# Patient Record
Sex: FEMALE | Race: WHITE | NOT HISPANIC OR LATINO | Employment: PART TIME | URBAN - METROPOLITAN AREA
[De-identification: names, ages, dates, MRNs, and addresses within clinical notes are randomized per-mention and may not be internally consistent; named-entity substitution may affect disease eponyms.]

---
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
## 2020-01-02 ENCOUNTER — OFFICE VISIT (OUTPATIENT)
Dept: OBGYN CLINIC | Facility: CLINIC | Age: 20
End: 2020-01-02
Payer: COMMERCIAL

## 2020-01-02 VITALS
HEIGHT: 64 IN | SYSTOLIC BLOOD PRESSURE: 114 MMHG | BODY MASS INDEX: 19.39 KG/M2 | WEIGHT: 113.6 LBS | DIASTOLIC BLOOD PRESSURE: 62 MMHG

## 2020-01-02 DIAGNOSIS — Z30.41 ENCOUNTER FOR SURVEILLANCE OF CONTRACEPTIVE PILLS: Primary | ICD-10-CM

## 2020-01-02 DIAGNOSIS — N92.6 IRREGULAR MENSES: ICD-10-CM

## 2020-01-02 PROCEDURE — 99213 OFFICE O/P EST LOW 20 MIN: CPT | Performed by: PHYSICIAN ASSISTANT

## 2020-01-02 RX ORDER — NORETHINDRONE AND ETHINYL ESTRADIOL AND FERROUS FUMARATE 0.8-25(24)
1 KIT ORAL DAILY
Qty: 28 TABLET | Refills: 8 | Status: SHIPPED | OUTPATIENT
Start: 2020-01-02 | End: 2020-09-24 | Stop reason: SDUPTHER

## 2020-01-02 NOTE — PROGRESS NOTES
Assessment/Plan:    No problem-specific Assessment & Plan notes found for this encounter  Diagnoses and all orders for this visit:    Encounter for surveillance of contraceptive pills    Irregular menses  -     Norethin-Eth Estradiol-Fe 0 8-25 MG-MCG CHEW; Chew 1 tablet daily        Discussed OCP use with patient  Stressed the need to take medication every day at the same time, and to not skip doses  Refills x 8 of Generess sent to pharmacy  Stressed condom use for STD prevention  If no problems, patient to return in August 2020 for yearly gyn exam  Time of visit 15 minutes; >50% spent counseling  Subjective:      Patient ID: Danna Kay is a 23 y o  female  Patient is here for birth control f/u  Was started on Generess in August States she is doing well overall  Periods are regular every 28 days, and bleeding lasts for 3 days  Denies heavy bleeding, severe cramping, HA, and mood symptoms  Did have two episodes of spotting, but they were after taking OCP early or late  She has not missed any doses  Denies bowel/bladder changes, pelvic pain, bloating, abdominal pain, n/v, and change in appetite  Would like to remain on this OCP  The following portions of the patient's history were reviewed and updated as appropriate: allergies, current medications, past family history, past medical history, past social history, past surgical history and problem list     Review of Systems   Constitutional: Negative for appetite change and unexpected weight change  Gastrointestinal: Negative for abdominal distention, abdominal pain, constipation, diarrhea, nausea and vomiting  Genitourinary: Negative for difficulty urinating, dysuria, frequency, genital sores, hematuria, menstrual problem, pelvic pain, urgency, vaginal bleeding, vaginal discharge and vaginal pain           Objective:      /62   Ht 5' 4" (1 626 m)   Wt 51 5 kg (113 lb 9 6 oz)   LMP 12/24/2019   BMI 19 50 kg/m²          Physical Exam   Constitutional: She is oriented to person, place, and time  Vital signs are normal  She appears well-developed and well-nourished  Neurological: She is alert and oriented to person, place, and time  Psychiatric: She has a normal mood and affect  Her behavior is normal  Judgment and thought content normal    Vitals reviewed

## 2020-01-02 NOTE — PATIENT INSTRUCTIONS
Refills x 8 of Generess sent to pharmacy  Take medication every day at the same time  Do not skip doses  Practice consistent condom use for STD prevention

## 2020-02-05 ENCOUNTER — TRANSCRIBE ORDERS (OUTPATIENT)
Dept: ADMINISTRATIVE | Age: 20
End: 2020-02-05

## 2020-02-05 ENCOUNTER — APPOINTMENT (OUTPATIENT)
Dept: LAB | Age: 20
End: 2020-02-05
Payer: COMMERCIAL

## 2020-02-05 DIAGNOSIS — J02.9 ACUTE PHARYNGITIS, UNSPECIFIED ETIOLOGY: ICD-10-CM

## 2020-02-05 DIAGNOSIS — J02.9 ACUTE PHARYNGITIS, UNSPECIFIED ETIOLOGY: Primary | ICD-10-CM

## 2020-02-05 LAB
ALBUMIN SERPL BCP-MCNC: 3.8 G/DL (ref 3.5–5)
ALP SERPL-CCNC: 52 U/L (ref 46–384)
ALT SERPL W P-5'-P-CCNC: 25 U/L (ref 12–78)
ANION GAP SERPL CALCULATED.3IONS-SCNC: 8 MMOL/L (ref 4–13)
AST SERPL W P-5'-P-CCNC: 18 U/L (ref 5–45)
BILIRUB SERPL-MCNC: 0.3 MG/DL (ref 0.2–1)
BUN SERPL-MCNC: 7 MG/DL (ref 5–25)
CALCIUM SERPL-MCNC: 9.5 MG/DL (ref 8.3–10.1)
CHLORIDE SERPL-SCNC: 108 MMOL/L (ref 100–108)
CO2 SERPL-SCNC: 23 MMOL/L (ref 21–32)
CREAT SERPL-MCNC: 0.69 MG/DL (ref 0.6–1.3)
GFR SERPL CREATININE-BSD FRML MDRD: 127 ML/MIN/1.73SQ M
GLUCOSE SERPL-MCNC: 175 MG/DL (ref 65–140)
POTASSIUM SERPL-SCNC: 3.1 MMOL/L (ref 3.5–5.3)
PROT SERPL-MCNC: 8.1 G/DL (ref 6.4–8.2)
SODIUM SERPL-SCNC: 139 MMOL/L (ref 136–145)

## 2020-02-05 PROCEDURE — 36415 COLL VENOUS BLD VENIPUNCTURE: CPT

## 2020-02-05 PROCEDURE — 86063 ANTISTREPTOLYSIN O SCREEN: CPT

## 2020-02-05 PROCEDURE — 86664 EPSTEIN-BARR NUCLEAR ANTIGEN: CPT

## 2020-02-05 PROCEDURE — 86663 EPSTEIN-BARR ANTIBODY: CPT

## 2020-02-05 PROCEDURE — 86308 HETEROPHILE ANTIBODY SCREEN: CPT

## 2020-02-05 PROCEDURE — 80053 COMPREHEN METABOLIC PANEL: CPT

## 2020-02-05 PROCEDURE — 86644 CMV ANTIBODY: CPT

## 2020-02-05 PROCEDURE — 86645 CMV ANTIBODY IGM: CPT

## 2020-02-05 PROCEDURE — 86665 EPSTEIN-BARR CAPSID VCA: CPT

## 2020-02-06 LAB
ASO AB TITR SER LA: NORMAL {TITER}
CMV IGG SERPL IA-ACNC: <0.6 U/ML (ref 0–0.59)
CMV IGM SERPL IA-ACNC: <30 AU/ML (ref 0–29.9)
EBV NA IGG SER IA-ACNC: <18 U/ML (ref 0–17.9)
EBV VCA IGG SER IA-ACNC: <18 U/ML (ref 0–17.9)
EBV VCA IGM SER IA-ACNC: <36 U/ML (ref 0–35.9)
HETEROPH AB SER QL: NEGATIVE
INTERPRETATION: NORMAL

## 2020-09-24 ENCOUNTER — OFFICE VISIT (OUTPATIENT)
Dept: OBGYN CLINIC | Facility: CLINIC | Age: 20
End: 2020-09-24
Payer: COMMERCIAL

## 2020-09-24 VITALS
BODY MASS INDEX: 19.12 KG/M2 | SYSTOLIC BLOOD PRESSURE: 120 MMHG | WEIGHT: 112 LBS | HEIGHT: 64 IN | DIASTOLIC BLOOD PRESSURE: 70 MMHG | TEMPERATURE: 98.7 F

## 2020-09-24 DIAGNOSIS — N92.6 IRREGULAR MENSES: ICD-10-CM

## 2020-09-24 DIAGNOSIS — Z30.41 ENCOUNTER FOR SURVEILLANCE OF CONTRACEPTIVE PILLS: Primary | ICD-10-CM

## 2020-09-24 PROCEDURE — 99213 OFFICE O/P EST LOW 20 MIN: CPT | Performed by: PHYSICIAN ASSISTANT

## 2020-09-24 RX ORDER — NORETHINDRONE AND ETHINYL ESTRADIOL AND FERROUS FUMARATE 0.8-25(24)
1 KIT ORAL DAILY
Qty: 28 TABLET | Refills: 5 | Status: SHIPPED | OUTPATIENT
Start: 2020-09-24 | End: 2021-03-02

## 2020-09-24 NOTE — PROGRESS NOTES
Assessment/Plan:    No problem-specific Assessment & Plan notes found for this encounter  Diagnoses and all orders for this visit:    Encounter for surveillance of contraceptive pills    Irregular menses  -     Norethin-Eth Estradiol-Fe 0 8-25 MG-MCG CHEW; Chew 1 tablet daily        Discussed OCP use with patient  Stressed the need to take medication every day at the same time, and to not skip doses  Refills x 5 sent to pharmacy  Stressed consistent condom use for STD prevention  Call if spotting worsens  If no problems, patient to return in March 2021 for yearly gyn exam   Time of visit 15 minutes; >50% spent counseling  Subjective:      Patient ID: Felipe Perez is a 21 y o  female  Patient is here for birth control f/u  States she is doing well overall  No complaints on her OCP  Periods are regular every 28 days, and bleeding lasts for 4 days  Denies heavy bleeding, severe cramping, HA, and mood symptoms  Does experience occasional spotting in the third week of her pill pack  Requests refills today  Patient denies bowel/bladder changes, pelvic pain, bloating, abdominal pain, n/v, and change in appetite  The following portions of the patient's history were reviewed and updated as appropriate: allergies, current medications, past family history, past medical history, past social history, past surgical history and problem list     Review of Systems   Constitutional: Negative for appetite change and unexpected weight change  Gastrointestinal: Negative for abdominal distention, abdominal pain, constipation, diarrhea, nausea and vomiting  Genitourinary: Negative for difficulty urinating, dysuria, frequency, genital sores, hematuria, menstrual problem, pelvic pain, urgency, vaginal bleeding, vaginal discharge and vaginal pain           Objective:      /70   Temp 98 7 °F (37 1 °C)   Ht 5' 4" (1 626 m)   Wt 50 8 kg (112 lb)   LMP 09/01/2020   BMI 19 22 kg/m²          Physical Exam  Vitals signs reviewed  Constitutional:       Appearance: Normal appearance  She is well-developed and normal weight  Neurological:      Mental Status: She is alert and oriented to person, place, and time  Psychiatric:         Mood and Affect: Mood normal          Behavior: Behavior normal  Behavior is cooperative  Thought Content:  Thought content normal          Judgment: Judgment normal

## 2020-09-24 NOTE — PATIENT INSTRUCTIONS
Refills x 5 of OCP sent to pharmacy  Take medication every day at the same time; do not skip doses  Practice consistent condom use for STD prevention  Call if spotting worsens

## 2020-10-02 ENCOUNTER — OFFICE VISIT (OUTPATIENT)
Dept: OBGYN CLINIC | Facility: CLINIC | Age: 20
End: 2020-10-02
Payer: COMMERCIAL

## 2020-10-02 VITALS
HEIGHT: 64 IN | SYSTOLIC BLOOD PRESSURE: 122 MMHG | TEMPERATURE: 98.2 F | BODY MASS INDEX: 19.12 KG/M2 | WEIGHT: 112 LBS | DIASTOLIC BLOOD PRESSURE: 74 MMHG

## 2020-10-02 DIAGNOSIS — N94.6 DYSMENORRHEA: ICD-10-CM

## 2020-10-02 DIAGNOSIS — N76.4 VULVAR ABSCESS: Primary | ICD-10-CM

## 2020-10-02 DIAGNOSIS — Z11.3 SCREEN FOR STD (SEXUALLY TRANSMITTED DISEASE): ICD-10-CM

## 2020-10-02 PROCEDURE — 87591 N.GONORRHOEAE DNA AMP PROB: CPT | Performed by: PHYSICIAN ASSISTANT

## 2020-10-02 PROCEDURE — 87491 CHLMYD TRACH DNA AMP PROBE: CPT | Performed by: PHYSICIAN ASSISTANT

## 2020-10-02 PROCEDURE — 99214 OFFICE O/P EST MOD 30 MIN: CPT | Performed by: PHYSICIAN ASSISTANT

## 2020-10-06 LAB
C TRACH DNA SPEC QL NAA+PROBE: NEGATIVE
N GONORRHOEA DNA SPEC QL NAA+PROBE: NEGATIVE

## 2020-10-08 ENCOUNTER — OFFICE VISIT (OUTPATIENT)
Dept: OBGYN CLINIC | Facility: CLINIC | Age: 20
End: 2020-10-08
Payer: COMMERCIAL

## 2020-10-08 ENCOUNTER — ULTRASOUND (OUTPATIENT)
Dept: OBGYN CLINIC | Facility: CLINIC | Age: 20
End: 2020-10-08
Payer: COMMERCIAL

## 2020-10-08 VITALS
WEIGHT: 110 LBS | DIASTOLIC BLOOD PRESSURE: 72 MMHG | HEIGHT: 64 IN | SYSTOLIC BLOOD PRESSURE: 124 MMHG | TEMPERATURE: 98.5 F | BODY MASS INDEX: 18.78 KG/M2

## 2020-10-08 DIAGNOSIS — R10.2 PELVIC PAIN: Primary | ICD-10-CM

## 2020-10-08 DIAGNOSIS — N76.4 VULVAR ABSCESS: Primary | ICD-10-CM

## 2020-10-08 DIAGNOSIS — N94.6 DYSMENORRHEA: ICD-10-CM

## 2020-10-08 PROCEDURE — 76856 US EXAM PELVIC COMPLETE: CPT | Performed by: OBSTETRICS & GYNECOLOGY

## 2020-10-08 PROCEDURE — 99213 OFFICE O/P EST LOW 20 MIN: CPT | Performed by: PHYSICIAN ASSISTANT

## 2020-10-15 ENCOUNTER — OFFICE VISIT (OUTPATIENT)
Dept: OBGYN CLINIC | Facility: CLINIC | Age: 20
End: 2020-10-15
Payer: COMMERCIAL

## 2020-10-15 VITALS
HEIGHT: 64 IN | SYSTOLIC BLOOD PRESSURE: 120 MMHG | BODY MASS INDEX: 19.12 KG/M2 | DIASTOLIC BLOOD PRESSURE: 74 MMHG | TEMPERATURE: 98.3 F | WEIGHT: 112 LBS

## 2020-10-15 DIAGNOSIS — N90.7 VULVAR CYST: Primary | ICD-10-CM

## 2020-10-15 PROCEDURE — 99213 OFFICE O/P EST LOW 20 MIN: CPT | Performed by: PHYSICIAN ASSISTANT

## 2021-03-01 DIAGNOSIS — N92.6 IRREGULAR MENSES: ICD-10-CM

## 2021-03-02 RX ORDER — NORETHINDRONE AND ETHINYL ESTRADIOL AND FERROUS FUMARATE 0.8-25(24)
KIT ORAL
Qty: 28 TABLET | Refills: 0 | Status: SHIPPED | OUTPATIENT
Start: 2021-03-02 | End: 2021-03-30 | Stop reason: SINTOL

## 2021-03-30 ENCOUNTER — ANNUAL EXAM (OUTPATIENT)
Dept: OBGYN CLINIC | Facility: CLINIC | Age: 21
End: 2021-03-30
Payer: COMMERCIAL

## 2021-03-30 VITALS
WEIGHT: 111 LBS | DIASTOLIC BLOOD PRESSURE: 74 MMHG | SYSTOLIC BLOOD PRESSURE: 122 MMHG | BODY MASS INDEX: 18.95 KG/M2 | HEIGHT: 64 IN

## 2021-03-30 DIAGNOSIS — Z01.411 ENCOUNTER FOR GYNECOLOGICAL EXAMINATION WITH ABNORMAL FINDING: Primary | ICD-10-CM

## 2021-03-30 DIAGNOSIS — N92.3 INTERMENSTRUAL BLEEDING: ICD-10-CM

## 2021-03-30 PROCEDURE — 99395 PREV VISIT EST AGE 18-39: CPT | Performed by: PHYSICIAN ASSISTANT

## 2021-03-30 RX ORDER — NORETHINDRONE ACETATE AND ETHINYL ESTRADIOL 1MG-20(21)
1 KIT ORAL DAILY
Qty: 28 TABLET | Refills: 3 | Status: SHIPPED | OUTPATIENT
Start: 2021-03-30 | End: 2021-07-20

## 2021-03-30 NOTE — PATIENT INSTRUCTIONS
Rx for Junel Fe 1/20 x 3 refills sent to pharmacy  Finish current pill pack, then start new medication  Take pill every day at the same time; do not skip doses  Practice consistent condom use for STD prevention  Call with problems

## 2021-03-30 NOTE — PROGRESS NOTES
Assessment/Plan:    No problem-specific Assessment & Plan notes found for this encounter  Diagnoses and all orders for this visit:    Encounter for gynecological examination with abnormal finding  -     Pap Lb (Liquid-based)    Intermenstrual bleeding  -     norethindrone-ethinyl estradiol (JUNEL FE 1/20) 1-20 MG-MCG per tablet; Take 1 tablet by mouth daily        Pap done  Will switch OCP  Rx for Junel Fe 1/20 x 3 refills sent to pharmacy  Patient to finish current pill pack, then start new medication  Take pill every day at the same time; do not skip doses  Practice consistent condom use for STD prevention  F/u in 3 months for birth control check; call with problems in the interim  Subjective:      Patient ID: Vero Cordon is a 24 y o  female  Patient is here for annual exam   States she is doing well  No complaints on her OCP  Periods are regular every 28 days, and bleeding lasts for 3-4 days  She denies heavy bleeding, severe cramping, headache, and mood symptoms  Complains of intermittent menstrual spotting  She takes pill every day at the same time and does not skip doses  Interested in switching OCP  Patient is sexually active with a monogamous partner  Last STD screen in October 2020 was negative  Declines testing today  Patient denies any change in bowel/bladder habits, pelvic pain, bloating, abdominal pain, n/v, change in appetite, and thyroid disease  Patient is performing self-breast exam   Denies new masses, skin changes, nipple discharge, and pain/tenderness  The following portions of the patient's history were reviewed and updated as appropriate: allergies, current medications, past family history, past medical history, past social history, past surgical history and problem list     Review of Systems   Constitutional: Negative for appetite change and unexpected weight change  Cardiovascular:        No masses, skin changes, nipple discharge, and pain/tenderness  Gastrointestinal: Negative for abdominal distention, abdominal pain, constipation, diarrhea, nausea and vomiting  Genitourinary: Positive for menstrual problem (Intermenstrual spotting)  Negative for difficulty urinating, dysuria, frequency, genital sores, hematuria, pelvic pain, urgency, vaginal bleeding, vaginal discharge and vaginal pain  Neurological: Negative for headaches  Objective:      /74   Ht 5' 4" (1 626 m)   Wt 50 3 kg (111 lb)   LMP 03/21/2021   BMI 19 05 kg/m²          Physical Exam  Vitals signs reviewed  Exam conducted with a chaperone present  Constitutional:       Appearance: Normal appearance  She is well-developed and normal weight  Neck:      Musculoskeletal: Neck supple  Thyroid: No thyromegaly  Pulmonary:      Effort: Pulmonary effort is normal    Chest:      Breasts: Breasts are symmetrical          Right: Normal  No swelling, bleeding, inverted nipple, mass, nipple discharge, skin change or tenderness  Left: Normal  No swelling, bleeding, inverted nipple, mass, nipple discharge, skin change or tenderness  Abdominal:      General: Abdomen is flat  There is no distension  Palpations: Abdomen is soft  Tenderness: There is no abdominal tenderness  Genitourinary:     General: Normal vulva  Pubic Area: No rash  Labia:         Right: No rash, tenderness, lesion or injury  Left: No rash, tenderness, lesion or injury  Vagina: Normal  No vaginal discharge, erythema, tenderness or bleeding  Cervix: Normal       Uterus: Normal        Adnexa: Right adnexa normal and left adnexa normal         Right: No mass, tenderness or fullness  Left: No mass, tenderness or fullness  Lymphadenopathy:      Cervical: No cervical adenopathy  Upper Body:      Right upper body: No supraclavicular or axillary adenopathy  Left upper body: No supraclavicular or axillary adenopathy        Lower Body: No right inguinal adenopathy  No left inguinal adenopathy  Skin:     General: Skin is warm and dry  Neurological:      Mental Status: She is alert and oriented to person, place, and time  Psychiatric:         Mood and Affect: Mood normal          Behavior: Behavior normal  Behavior is cooperative  Thought Content:  Thought content normal          Judgment: Judgment normal

## 2021-04-03 LAB
CYTOLOGIST CVX/VAG CYTO: NORMAL
DX ICD CODE: NORMAL
OTHER STN SPEC: NORMAL
PATH REPORT.FINAL DX SPEC: NORMAL
SL AMB NOTE:: NORMAL
SL AMB SPECIMEN ADEQUACY: NORMAL

## 2021-04-06 ENCOUNTER — TELEPHONE (OUTPATIENT)
Dept: OBGYN CLINIC | Facility: CLINIC | Age: 21
End: 2021-04-06

## 2021-04-06 NOTE — TELEPHONE ENCOUNTER
Spoke with patient regarding Pap results  Cytology negative, but showed presence of Candida  Recommended patient to use OTC Monistat 7 day  Call with problems

## 2021-07-27 ENCOUNTER — OFFICE VISIT (OUTPATIENT)
Dept: OBGYN CLINIC | Facility: CLINIC | Age: 21
End: 2021-07-27
Payer: COMMERCIAL

## 2021-07-27 VITALS
WEIGHT: 109 LBS | HEIGHT: 64 IN | BODY MASS INDEX: 18.61 KG/M2 | DIASTOLIC BLOOD PRESSURE: 72 MMHG | SYSTOLIC BLOOD PRESSURE: 120 MMHG

## 2021-07-27 DIAGNOSIS — N92.3 INTERMENSTRUAL BLEEDING: ICD-10-CM

## 2021-07-27 DIAGNOSIS — Z30.41 ENCOUNTER FOR SURVEILLANCE OF CONTRACEPTIVE PILLS: Primary | ICD-10-CM

## 2021-07-27 PROCEDURE — 99213 OFFICE O/P EST LOW 20 MIN: CPT | Performed by: PHYSICIAN ASSISTANT

## 2021-07-27 RX ORDER — NORETHINDRONE ACETATE AND ETHINYL ESTRADIOL 1MG-20(21)
1 KIT ORAL DAILY
Qty: 84 TABLET | Refills: 2 | Status: SHIPPED | OUTPATIENT
Start: 2021-07-27

## 2021-07-27 RX ORDER — EPINEPHRINE 0.3 MG/.3ML
INJECTION SUBCUTANEOUS
COMMUNITY
Start: 2021-05-13

## 2021-07-27 NOTE — PATIENT INSTRUCTIONS
Refills of OCP sent to pharmacy  Take pill every day at the same time; do not skip doses  Practice consistent condom use for STD prevention  Call with problems

## 2021-07-27 NOTE — PROGRESS NOTES
Assessment/Plan:    No problem-specific Assessment & Plan notes found for this encounter  Diagnoses and all orders for this visit:    Encounter for surveillance of contraceptive pills    Intermenstrual bleeding  -     norethindrone-ethinyl estradiol (Hailey FE 1/20) 1-20 MG-MCG per tablet; Take 1 tablet by mouth daily    Other orders  -     EPINEPHrine (EPIPEN) 0 3 mg/0 3 mL SOAJ; INJECT THE CONTENTS OF 1 PEN (0 3ML) INTO THE MUSCLE AS NEEDED  -     silver sulfadiazine (SILVADENE,SSD) 1 % cream; APPLY TOPICALLY ONCE DAILY FOR 5 DAYS        Discussed OCP use with patient  Continue to take pill every day at the same time; do not skip doses  Practice consistent condom use for STD prevention  Refills of OCP sent to pharmacy  F/u in April 2022 for yearly gyn exam   Call with problems in the interim  Subjective:      Patient ID: Corby Delatorre is a 24 y o  female  Patient is here for birth control f/u  Was started on Junel Fe 1/20 in April  States she is doing well overall  Periods are regular every 28 days, and bleeding lasts for 7 days  She denies heavy bleeding, severe cramping, HA, and mood symptoms  Patient also denies bowel/bladder changes, pelvic pain, bloating, abdominal pain, n/v, and appetite changes  Would like to continue on current OCP  The following portions of the patient's history were reviewed and updated as appropriate: allergies, current medications, past family history, past medical history, past social history, past surgical history and problem list     Review of Systems   Constitutional: Negative for appetite change and unexpected weight change  Gastrointestinal: Negative for abdominal distention, abdominal pain, constipation, diarrhea, nausea and vomiting  Genitourinary: Negative for difficulty urinating, dysuria, frequency, genital sores, hematuria, menstrual problem, pelvic pain, urgency, vaginal bleeding, vaginal discharge and vaginal pain     Neurological: Negative for headaches  Objective:      /72 (BP Location: Left arm, Patient Position: Sitting, Cuff Size: Standard)   Ht 5' 4" (1 626 m)   Wt 49 4 kg (109 lb)   LMP 07/01/2021   BMI 18 71 kg/m²          Physical Exam  Vitals reviewed  Constitutional:       Appearance: Normal appearance  She is well-developed and normal weight  Neurological:      Mental Status: She is alert and oriented to person, place, and time  Psychiatric:         Mood and Affect: Mood normal          Behavior: Behavior normal  Behavior is cooperative  Thought Content:  Thought content normal          Judgment: Judgment normal

## 2021-08-06 ENCOUNTER — TELEPHONE (OUTPATIENT)
Dept: OBGYN CLINIC | Facility: CLINIC | Age: 21
End: 2021-08-06

## 2021-08-06 ENCOUNTER — APPOINTMENT (OUTPATIENT)
Dept: LAB | Facility: CLINIC | Age: 21
End: 2021-08-06
Payer: COMMERCIAL

## 2021-08-06 DIAGNOSIS — R55 SYNCOPE AND COLLAPSE: ICD-10-CM

## 2021-08-06 DIAGNOSIS — M79.605 LEFT LEG PAIN: ICD-10-CM

## 2021-08-06 LAB
ALBUMIN SERPL BCP-MCNC: 4.5 G/DL (ref 3.5–5)
ALP SERPL-CCNC: 76 U/L (ref 46–116)
ALT SERPL W P-5'-P-CCNC: 23 U/L (ref 12–78)
ANION GAP SERPL CALCULATED.3IONS-SCNC: 10 MMOL/L (ref 4–13)
AST SERPL W P-5'-P-CCNC: 19 U/L (ref 5–45)
BASOPHILS # BLD AUTO: 0.03 THOUSANDS/ΜL (ref 0–0.1)
BASOPHILS NFR BLD AUTO: 1 % (ref 0–1)
BILIRUB SERPL-MCNC: 0.45 MG/DL (ref 0.2–1)
BUN SERPL-MCNC: 11 MG/DL (ref 5–25)
CALCIUM SERPL-MCNC: 9.4 MG/DL (ref 8.3–10.1)
CHLORIDE SERPL-SCNC: 104 MMOL/L (ref 100–108)
CO2 SERPL-SCNC: 26 MMOL/L (ref 21–32)
CREAT SERPL-MCNC: 0.59 MG/DL (ref 0.6–1.3)
D DIMER PPP FEU-MCNC: <0.27 UG/ML FEU
EOSINOPHIL # BLD AUTO: 0.04 THOUSAND/ΜL (ref 0–0.61)
EOSINOPHIL NFR BLD AUTO: 1 % (ref 0–6)
ERYTHROCYTE [DISTWIDTH] IN BLOOD BY AUTOMATED COUNT: 12.3 % (ref 11.6–15.1)
GFR SERPL CREATININE-BSD FRML MDRD: 131 ML/MIN/1.73SQ M
GLUCOSE SERPL-MCNC: 107 MG/DL (ref 65–140)
HCT VFR BLD AUTO: 42 % (ref 34.8–46.1)
HGB BLD-MCNC: 14.1 G/DL (ref 11.5–15.4)
IMM GRANULOCYTES # BLD AUTO: 0 THOUSAND/UL (ref 0–0.2)
IMM GRANULOCYTES NFR BLD AUTO: 0 % (ref 0–2)
LYMPHOCYTES # BLD AUTO: 1.43 THOUSANDS/ΜL (ref 0.6–4.47)
LYMPHOCYTES NFR BLD AUTO: 28 % (ref 14–44)
MCH RBC QN AUTO: 30.4 PG (ref 26.8–34.3)
MCHC RBC AUTO-ENTMCNC: 33.6 G/DL (ref 31.4–37.4)
MCV RBC AUTO: 91 FL (ref 82–98)
MONOCYTES # BLD AUTO: 0.5 THOUSAND/ΜL (ref 0.17–1.22)
MONOCYTES NFR BLD AUTO: 10 % (ref 4–12)
NEUTROPHILS # BLD AUTO: 3.16 THOUSANDS/ΜL (ref 1.85–7.62)
NEUTS SEG NFR BLD AUTO: 60 % (ref 43–75)
NRBC BLD AUTO-RTO: 0 /100 WBCS
PLATELET # BLD AUTO: 243 THOUSANDS/UL (ref 149–390)
PMV BLD AUTO: 9.7 FL (ref 8.9–12.7)
POTASSIUM SERPL-SCNC: 4.2 MMOL/L (ref 3.5–5.3)
PROT SERPL-MCNC: 8.5 G/DL (ref 6.4–8.2)
RBC # BLD AUTO: 4.64 MILLION/UL (ref 3.81–5.12)
SODIUM SERPL-SCNC: 140 MMOL/L (ref 136–145)
WBC # BLD AUTO: 5.16 THOUSAND/UL (ref 4.31–10.16)

## 2021-08-06 PROCEDURE — 85379 FIBRIN DEGRADATION QUANT: CPT

## 2021-08-06 PROCEDURE — 36415 COLL VENOUS BLD VENIPUNCTURE: CPT

## 2021-08-06 PROCEDURE — 80053 COMPREHEN METABOLIC PANEL: CPT

## 2021-08-06 PROCEDURE — 85025 COMPLETE CBC W/AUTO DIFF WBC: CPT

## 2021-08-06 NOTE — TELEPHONE ENCOUNTER
Patient's mother called stating she is taking patient to the hospital because of possible blood clot  Patient had pain in her leg this morning  When she sat up, turned white and feels like she is going to pass out  Patient should not take OCP today  Call with f/u

## 2021-08-10 ENCOUNTER — OFFICE VISIT (OUTPATIENT)
Dept: CARDIOLOGY CLINIC | Facility: CLINIC | Age: 21
End: 2021-08-10
Payer: COMMERCIAL

## 2021-08-10 VITALS
DIASTOLIC BLOOD PRESSURE: 68 MMHG | OXYGEN SATURATION: 98 % | HEART RATE: 78 BPM | WEIGHT: 107.5 LBS | SYSTOLIC BLOOD PRESSURE: 120 MMHG | BODY MASS INDEX: 18.35 KG/M2 | HEIGHT: 64 IN

## 2021-08-10 DIAGNOSIS — R42 DIZZINESS: Primary | ICD-10-CM

## 2021-08-10 DIAGNOSIS — R55 NEAR SYNCOPE: ICD-10-CM

## 2021-08-10 PROCEDURE — 93000 ELECTROCARDIOGRAM COMPLETE: CPT | Performed by: INTERNAL MEDICINE

## 2021-08-10 PROCEDURE — 99214 OFFICE O/P EST MOD 30 MIN: CPT | Performed by: INTERNAL MEDICINE

## 2021-08-10 NOTE — ASSESSMENT & PLAN NOTE
Low likelihood of cardiac arrhythmia  Most likely symptoms secondary to dehydration and orthostasis  Encouraged to stay hydrated, avoid prolonged exposure to heat  Avoid prolonged standing  Report any symptoms of palpitation or fainting

## 2021-08-10 NOTE — PATIENT INSTRUCTIONS
EKG is normal today  Echocardiogram planned to evaluate heart function and rule out significant valvular heart disease  Diagnosis: Neurocardiogenic or vasovagal syncope  Nonpharmacologic measures for management of vasovagal syncope and to avoid further episodes  Avoidance of prolonged standing and dehydration  Lower extremity exercises advised  Avoid excessive heat exposure

## 2021-08-10 NOTE — PROGRESS NOTES
[address] CARDIOLOGY [place] [address] [address]  Phone#  [phone]  Fax#  [phone]  [facility] Cardiology Office Consultation             NAME: Shayy Murray  AGE: 24 y o  SEX: female   : 2000   MRN: [id number]    DATE: 8/10/2021  TIME: 11:00 AM    Assessment and plan:    1  Dizziness  Assessment & Plan:    Low likelihood of cardiac arrhythmia  Most likely symptoms secondary to dehydration and orthostasis  Encouraged to stay hydrated, avoid prolonged exposure to heat  Avoid prolonged standing  Report any symptoms of palpitation or fainting  Orders:  -     POCT ECG    2  Near syncope  Assessment & Plan:    Most likely vasovagal in etiology  Low likelihood of cardiac dysrhythmia  Echo planned to evaluate for structural heart disease given the family history of valvular heart disease  Resting EKG is normal   QT interval is normal   Nonpharmacologic measures discussed for prevention of further episodes including staying hydrated, avoid prolonged standing, avoiding extreme heat exposure  Orders:  -     Echo complete with contrast if indicated; Future; Expected date: 08/10/2021         Discussion:    Chief Complaint   Patient presents with    New Patient Visit     pt c/o having episodes of diziness, sweats  constant pain/ cramps on left leg  HPI:    Shayy Murray is a 24y o -year-old female who presents to the cardiology clinic for evaluation  Her symptoms began in  of this year  She was out shopping in outdoor mall  She got very hot  Reported some dizziness and lightheadedness  She went to the bathroom  She felt very faint and felt like she was going to pass out  She called 911  By the time the ambulance got there, her blood pressure and heart rate were normal and her symptoms have improved  She did not go to the emergency room  She then saw her doctor in follow-up      Earlier this week she was working in [employer] and was standing on her feet for 8-10 hours a day in the extreme heat, she had similar symptoms of dizziness, sweating and also reported pain and cramps in the left leg  She did not totally pass out this time  Symptoms did improve on sitting down  She denied any palpitations  No preceding chest pain  She reported some lightheadedness but no vertigo like symptoms  Previous TSH was normal less than 2 years ago  Recent lab work was normal     She underwent a D-dimer assay which was normal implying low likelihood of underlying DVT  Her father reports history of valvular heart disease/Cardiac murmur  She has no personal history of congenital heart disease  Current symptoms: She reports episodes of dizziness on and off  She reports cold sweats at times  No chest pain or shortness of breath  Reports cramps in the left leg  No history of DVT  She is on OC tablets  Past history, family history, social history, current medications, vital signs, recent labs reviewed independently on this visit      Cardiology Problem list:  Near syncope: Vasovagal    BP Readings from Last 4 Encounters:   08/10/21 120/68   07/27/21 120/72   03/30/21 122/74   10/15/20 120/74      Wt Readings from Last 3 Encounters:   08/10/21 48 8 kg (107 lb 8 oz)   07/27/21 49 4 kg (109 lb)   03/30/21 50 3 kg (111 lb)       Lab Results   Component Value Date    LDLCALC 83 12/22/2018     Lab Results   Component Value Date    CREATININE 0 59 (L) 08/06/2021      Lab Results   Component Value Date    [id number] 1 740 06/14/2019         ALLERGIES:  No Known Allergies      Current Outpatient Medications:     norethindrone-ethinyl estradiol (Denise VO 1/20) 1-20 MG-MCG per tablet, Take 1 tablet by mouth daily, Disp: 84 tablet, Rfl: 2    EPINEPHrine (EPIPEN) 0 3 mg/0 3 mL SOAJ, INJECT THE CONTENTS OF 1 PEN (0 3ML) INTO THE MUSCLE AS NEEDED, Disp: , Rfl:     silver sulfadiazine (SILVADENE,SSD) 1 % cream, APPLY TOPICALLY ONCE DAILY FOR 5 DAYS, Disp: , Rfl: Review of Systems   Constitutional: Negative for activity change, appetite change and unexpected weight change  HENT: Negative for trouble swallowing  Eyes: Negative for visual disturbance  Respiratory: Negative for chest tightness, shortness of breath and wheezing  Cardiovascular: Negative for chest pain, palpitations and leg swelling  Gastrointestinal: Negative for blood in stool  Endocrine: Negative for polyuria  Sweating   Genitourinary: Negative for hematuria  Musculoskeletal: Positive for myalgias  Negative for arthralgias  Skin: Negative for rash  Neurological: Positive for dizziness and syncope  Negative for weakness  Hematological: Does not bruise/bleed easily  Psychiatric/Behavioral: Negative for behavioral problems  History reviewed  No pertinent past medical history      Past Surgical History:   Procedure Laterality Date    WISDOM TOOTH EXTRACTION         Family History   Problem Relation Age of Onset   [facility] Migraines Mother     Hypertension Father     Liver disease Maternal Grandfather        Social History     Socioeconomic History    Marital status: Single     Spouse name: Not on file    Number of children: Not on file    Years of education: Not on file    Highest education level: Not on file   Occupational History    Not on file   Tobacco Use    Smoking status: Never Smoker    Smokeless tobacco: Never Used   Vaping Use    Vaping Use: Never used   Substance and Sexual Activity    Alcohol use: Yes     Comment: social    Drug use: No    Sexual activity: Yes     Partners: Male     Birth control/protection: OCP   Other Topics Concern    Not on file   Social History Narrative    Not on file     Social Determinants of Health     Financial Resource Strain:     Difficulty of Paying Living Expenses:    Food Insecurity:     Worried About Running Out of Food in the Last Year:     [address] in the Last Year:    Transportation Needs:     Lack of Transportation (Medical):  Lack of Transportation (Non-Medical):    Physical Activity:     Days of Exercise per Week:     Minutes of Exercise per Session:    Stress:     Feeling of Stress :    Social Connections:     Frequency of Communication with Friends and Family:     Frequency of Social Gatherings with Friends and Family:     Attends Bahai Services:     Active Member of Clubs or Organizations:     Attends Club or Organization Meetings:     Marital Status:    Intimate Partner Violence:     Fear of Current or Ex-Partner:     Emotionally Abused:     Physically Abused:     Sexually Abused:          Objective:     Vitals:    08/10/21 1021   BP: 120/68   Pulse: 78   SpO2: 98%     Wt Readings from Last 3 Encounters:   08/10/21 48 8 kg (107 lb 8 oz)   07/27/21 49 4 kg (109 lb)   03/30/21 50 3 kg (111 lb)     Pulse Readings from Last 3 Encounters:   08/10/21 78   01/30/19 94     BP Readings from Last 3 Encounters:   08/10/21 120/68   07/27/21 120/72   03/30/21 122/74         Physical Exam  Constitutional:       General: She is not in acute distress  HENT:      Head: Normocephalic  Eyes:      Conjunctiva/sclera: Conjunctivae normal    Neck:      Vascular: No carotid bruit  Cardiovascular:      Rate and Rhythm: Normal rate and regular rhythm  Heart sounds: S1 normal and S2 normal  No murmur heard  Comments:   Mild sinus arrhythmia  No orthostatic tachycardia  Pulmonary:      Effort: Pulmonary effort is normal       Breath sounds: Normal breath sounds  Musculoskeletal:      Right lower leg: No edema  Left lower leg: No edema  Comments: No significant calf tenderness or swelling noted  Skin:     General: Skin is warm  Neurological:      General: No focal deficit present     Psychiatric:         Mood and Affect: Mood normal          Pertinent Laboratory/Diagnostic Studies:    Laboratory studies reviewed personally by Olga Vargas MD    BMP:   Lab Results   Component Value Date    SODIUM 140 08/06/2021    K 4 2 08/06/2021     08/06/2021    CO2 26 08/06/2021    BUN 11 08/06/2021    CREATININE 0 59 (L) 08/06/2021    GLUC 107 08/06/2021    CALCIUM 9 4 08/06/2021     CBC:  Lab Results   Component Value Date    WBC 5 16 08/06/2021    RBC 4 64 08/06/2021    HGB 14 1 08/06/2021    HCT 42 0 08/06/2021    MCV 91 08/06/2021    MCH 30 4 08/06/2021    RDW 12 3 08/06/2021     08/06/2021     Coags:    Lipid Profile:   No results found for: CHOL  Lab Results   Component Value Date    HDL 56 12/22/2018     Lab Results   Component Value Date    LDLCALC 83 12/22/2018     Lab Results   Component Value Date    TRIG 42 12/22/2018      Lab Results   Component Value Date    [id number] 1 740 06/14/2019     Lab Results   Component Value Date    ALT 23 08/06/2021    AST 19 08/06/2021         Imaging Studies:   No results found  Cardiac testing:   EKG reviewed personally: normal sinus rhythm, RAD  Orders Placed This Encounter   Procedures    POCT ECG    Echo complete with contrast if indicated           Matt Powell MD, [facility] of the record may have been created with voice recognition software  Occasional wrong word or "sound a like" substitutions may have occurred due to the inherent limitations of voice recognition software  Read the chart carefully and recognize, using context, where substitutions have occurred  If you have any questions or concerns about the context, text or information contained within the body of this dictation, please contact myself, the provider, for further clarification

## 2021-08-10 NOTE — ASSESSMENT & PLAN NOTE
Most likely vasovagal in etiology  Low likelihood of cardiac dysrhythmia  Echo planned to evaluate for structural heart disease given the family history of valvular heart disease  Resting EKG is normal   QT interval is normal   Nonpharmacologic measures discussed for prevention of further episodes including staying hydrated, avoid prolonged standing, avoiding extreme heat exposure

## 2021-09-15 ENCOUNTER — HOSPITAL ENCOUNTER (OUTPATIENT)
Dept: NON INVASIVE DIAGNOSTICS | Facility: CLINIC | Age: 21
Discharge: HOME | End: 2021-09-15
Payer: COMMERCIAL

## 2021-09-15 DIAGNOSIS — R55 NEAR SYNCOPE: ICD-10-CM

## 2021-09-15 PROCEDURE — 93306 TTE W/DOPPLER COMPLETE: CPT

## 2021-09-15 PROCEDURE — 93306 TTE W/DOPPLER COMPLETE: CPT | Performed by: INTERNAL MEDICINE

## 2021-09-15 NOTE — RESULT ENCOUNTER NOTE
ECHO reviewed:   Normal pumping strength of the heart muscle  Valves: No serious abnormalities seen  No cardiac cause of symptoms identified  Overall these results are reassuring

## 2022-03-31 ENCOUNTER — OFFICE VISIT (OUTPATIENT)
Dept: OBGYN CLINIC | Facility: CLINIC | Age: 22
End: 2022-03-31
Payer: COMMERCIAL

## 2022-03-31 VITALS
BODY MASS INDEX: 18.68 KG/M2 | HEIGHT: 64 IN | SYSTOLIC BLOOD PRESSURE: 100 MMHG | WEIGHT: 109.4 LBS | DIASTOLIC BLOOD PRESSURE: 62 MMHG

## 2022-03-31 DIAGNOSIS — Z30.41 ENCOUNTER FOR SURVEILLANCE OF CONTRACEPTIVE PILLS: ICD-10-CM

## 2022-03-31 DIAGNOSIS — N92.3 INTERMENSTRUAL BLEEDING: ICD-10-CM

## 2022-03-31 DIAGNOSIS — Z01.419 WOMEN'S ANNUAL ROUTINE GYNECOLOGICAL EXAMINATION: Primary | ICD-10-CM

## 2022-03-31 DIAGNOSIS — Z11.3 SCREENING FOR STD (SEXUALLY TRANSMITTED DISEASE): ICD-10-CM

## 2022-03-31 PROCEDURE — 87591 N.GONORRHOEAE DNA AMP PROB: CPT | Performed by: OBSTETRICS & GYNECOLOGY

## 2022-03-31 PROCEDURE — 87491 CHLMYD TRACH DNA AMP PROBE: CPT | Performed by: OBSTETRICS & GYNECOLOGY

## 2022-03-31 PROCEDURE — 99385 PREV VISIT NEW AGE 18-39: CPT | Performed by: OBSTETRICS & GYNECOLOGY

## 2022-03-31 RX ORDER — DROSPIRENONE AND ETHINYL ESTRADIOL 0.02-3(28)
1 KIT ORAL DAILY
Qty: 84 TABLET | Refills: 3 | Status: SHIPPED | OUTPATIENT
Start: 2022-03-31

## 2022-03-31 NOTE — PROGRESS NOTES
ASSESSMENT & PLAN:   Diagnoses and all orders for this visit:    Women's annual routine gynecological examination    Encounter for surveillance of contraceptive pills  -     drospirenone-ethinyl estradiol (RIC) 3-0 02 MG per tablet; Take 1 tablet by mouth daily    Intermenstrual bleeding  -     drospirenone-ethinyl estradiol (RIC) 3-0 02 MG per tablet; Take 1 tablet by mouth daily    Screening for STD (sexually transmitted disease)  -     Chlamydia/GC amplified DNA by PCR          The following were reviewed in today's visit: ASCCP guidelines, Gardisil vaccination - info given, considering starting the vaccine series, STD testing breast self exam, use and side effects of OCPs, exercise and healthy diet  Patient to return to office in yearly for annual exam      All questions have been answered to her satisfaction  Spotting - will try a different class of OCP, with 4 day placebo - Ric  - if spotting does not improve, consider US      CC:  Annual Gynecologic Examination  Chief Complaint   Patient presents with    Gynecologic Exam     neg pap 3/30/21, patient states she does experiencing spotting in between periods  HPI: Shady Soto is a 25 y o  [facility] who presents for annual gynecologic examination  She has the following concerns:  Spotting daily, taking OCP consistently, no pain, no bleeding with IC      Health Maintenance:    Exercise: frequently  Breast exams/breast awareness: yes  Diet: well balanced diet      History reviewed  No pertinent past medical history  Past Surgical History:   Procedure Laterality Date    WISDOM TOOTH EXTRACTION         Past OB/Gyn History:  Period Duration (Days): 4  Period Pattern: (!) Irregular  Menstrual Flow: ModeratePatient's last menstrual period was 03/17/2022  Last Pap: 2021 : no abnormalities  History of abnormal Pap smear: no  HPV vaccine completed: no    Patient is currently sexually active     STD testing: yes  Current contraception: OCP (estrogen/progesterone)      Family History  Family History   Problem Relation Age of Onset   Aretha Mastersonunes Migraines Mother     Hypertension Father     Liver disease Maternal Grandfather        Family history of uterine or ovarian cancer: no  Family history of breast cancer: no  Family history of colon cancer: no    Social History:  Social History     Socioeconomic History    Marital status: Single     Spouse name: Not on file    Number of children: Not on file    Years of education: Not on file    Highest education level: Not on file   Occupational History    Not on file   Tobacco Use    Smoking status: Never Smoker    Smokeless tobacco: Never Used   Vaping Use    Vaping Use: Never used   Substance and Sexual Activity    Alcohol use: Yes     Comment: social    Drug use: No    Sexual activity: Yes     Partners: Male     Birth control/protection: OCP   Other Topics Concern    Not on file   Social History Narrative    Not on file     Social Determinants of Health     Financial Resource Strain: Not on file   Food Insecurity: Not on file   Transportation Needs: Not on file   Physical Activity: Not on file   Stress: Not on file   Social Connections: Not on file   Intimate Partner Violence: Not on file   Housing Stability: Not on file     Domestic violence screen: negative    Allergies:  No Known Allergies    Medications:    Current Outpatient Medications:     EPINEPHrine (EPIPEN) 0 3 mg/0 3 mL SOAJ, INJECT THE CONTENTS OF 1 PEN (0 3ML) INTO THE MUSCLE AS NEEDED, Disp: , Rfl:     norethindrone-ethinyl estradiol (Denise FE 1/20) 1-20 MG-MCG per tablet, Take 1 tablet by mouth daily, Disp: 84 tablet, Rfl: 2    silver sulfadiazine (SILVADENE,SSD) 1 % cream, APPLY TOPICALLY ONCE DAILY FOR 5 DAYS, Disp: , Rfl:     drospirenone-ethinyl estradiol (RIC) 3-0 02 MG per tablet, Take 1 tablet by mouth daily, Disp: 84 tablet, Rfl: 3    Review of Systems:  Review of Systems   Constitutional: Negative  HENT: Negative  Respiratory: Negative  Cardiovascular: Negative  Gastrointestinal: Negative  Genitourinary: Positive for menstrual problem and vaginal bleeding  Negative for pelvic pain, vaginal discharge and vaginal pain  Skin: Negative  Neurological: Negative  Psychiatric/Behavioral: Negative  Physical Exam:  /62 (BP Location: Right arm, Patient Position: Sitting, Cuff Size: Standard)   Ht 5' 4" (1 626 m)   Wt 49 6 kg (109 lb 6 4 oz)   LMP 03/17/2022   BMI 18 78 kg/m²    Physical Exam  Constitutional:       Appearance: Normal appearance  Genitourinary:      Bladder and urethral meatus normal       No lesions in the vagina  Right Labia: No rash, tenderness, lesions, skin changes or Bartholin's cyst      Left Labia: No tenderness, lesions, skin changes, Bartholin's cyst or rash  No vaginal erythema, tenderness or bleeding  Right Adnexa: not tender, not full and no mass present  Left Adnexa: not tender, not full and no mass present  Cervix is nulliparous  No cervical discharge, friability, lesion or polyp  Uterus is not enlarged, fixed or tender  No uterine mass detected  Urethral meatus caruncle not present  No urethral tenderness or mass present  Breasts:      Right: No swelling, bleeding, inverted nipple, mass, nipple discharge, skin change or tenderness  Left: No swelling, bleeding, inverted nipple, mass, nipple discharge, skin change or tenderness  HENT:      Head: Normocephalic and atraumatic  Eyes:      Extraocular Movements: Extraocular movements intact  Conjunctiva/sclera: Conjunctivae normal       Pupils: Pupils are equal, round, and reactive to light  Cardiovascular:      Rate and Rhythm: Normal rate and regular rhythm  Heart sounds: Normal heart sounds  No murmur heard  Pulmonary:      Effort: Pulmonary effort is normal  No respiratory distress  Breath sounds: Normal breath sounds   No wheezing or rales    Abdominal:      General: There is no distension  Palpations: Abdomen is soft  Tenderness: There is no abdominal tenderness  There is no guarding  Neurological:      General: No focal deficit present  Mental Status: She is alert and oriented to person, place, and time  Skin:     General: Skin is warm and dry  Psychiatric:         Mood and Affect: Mood normal          Behavior: Behavior normal    Vitals and nursing note reviewed

## 2022-04-02 LAB
C TRACH DNA SPEC QL NAA+PROBE: NEGATIVE
N GONORRHOEA DNA SPEC QL NAA+PROBE: NEGATIVE

## 2022-10-26 ENCOUNTER — TELEPHONE (OUTPATIENT)
Dept: OBGYN CLINIC | Facility: CLINIC | Age: 22
End: 2022-10-26

## 2022-10-26 DIAGNOSIS — N92.3 INTERMENSTRUAL SPOTTING: Primary | ICD-10-CM

## 2022-10-26 NOTE — TELEPHONE ENCOUNTER
Pts  Mom calling with lenka on the line  They stated she was given a generic bruno at pharmacy (zumandimine) in March and Ebenezer Mak did really well on this, although still having spotting  Was given Bruno this refill and pt  Is experiencing extreme mood swings during period week  Pharmacy told patient and mom that they could  a pack of zumandimine, but would have to pay out of pocket (because she has already picked up what insurance allowed this month )    Pt  And mom wondering what they can do to help control these symptoms currently  Advised to start new pack of zumandimine as soon as she is able to  Advised to report to ED with SI/HI  Pt  And mom both verbalize understanding

## 2022-10-28 NOTE — TELEPHONE ENCOUNTER
Discussed with pt  Pt  Is feeling much better since starting new pack of pills  Advised to continue  Can consider continuous dosing and having a period every 3 cycles  Pt  Will let us know if she experiences these symptoms again  Phyllistine Kawasaki

## 2022-10-28 NOTE — TELEPHONE ENCOUNTER
I would not recommend switching at this point, likely just a coincidence as she has been ok for the last few months

## 2023-01-07 DIAGNOSIS — Z30.41 ENCOUNTER FOR SURVEILLANCE OF CONTRACEPTIVE PILLS: ICD-10-CM

## 2023-01-07 DIAGNOSIS — N92.3 INTERMENSTRUAL BLEEDING: ICD-10-CM

## 2023-01-08 RX ORDER — DROSPIRENONE AND ETHINYL ESTRADIOL 0.02-3(28)
KIT ORAL
Qty: 84 TABLET | Refills: 3 | Status: SHIPPED | OUTPATIENT
Start: 2023-01-08

## 2023-04-04 ENCOUNTER — ANNUAL EXAM (OUTPATIENT)
Dept: OBGYN CLINIC | Facility: CLINIC | Age: 23
End: 2023-04-04

## 2023-04-04 VITALS
SYSTOLIC BLOOD PRESSURE: 108 MMHG | WEIGHT: 109 LBS | DIASTOLIC BLOOD PRESSURE: 68 MMHG | HEIGHT: 64 IN | BODY MASS INDEX: 18.61 KG/M2

## 2023-04-04 DIAGNOSIS — Z30.41 ENCOUNTER FOR SURVEILLANCE OF CONTRACEPTIVE PILLS: ICD-10-CM

## 2023-04-04 DIAGNOSIS — Z01.419 WOMEN'S ANNUAL ROUTINE GYNECOLOGICAL EXAMINATION: Primary | ICD-10-CM

## 2023-04-04 DIAGNOSIS — Z11.3 SCREENING FOR STDS (SEXUALLY TRANSMITTED DISEASES): ICD-10-CM

## 2023-04-04 DIAGNOSIS — N92.3 INTERMENSTRUAL BLEEDING: ICD-10-CM

## 2023-04-04 RX ORDER — DROSPIRENONE AND ETHINYL ESTRADIOL 0.02-3(28)
1 KIT ORAL DAILY
Qty: 84 TABLET | Refills: 3 | Status: SHIPPED | OUTPATIENT
Start: 2023-04-04

## 2023-04-04 NOTE — PROGRESS NOTES
ASSESSMENT & PLAN:   Diagnoses and all orders for this visit:    Women's annual routine gynecological examination    Encounter for surveillance of contraceptive pills  -     drospirenone-ethinyl estradiol (Lo-Zumandimine) 3-0 02 MG per tablet; Take 1 tablet by mouth daily    Intermenstrual bleeding  -     drospirenone-ethinyl estradiol (Lo-Zumandimine) 3-0 02 MG per tablet; Take 1 tablet by mouth daily          The following were reviewed in today's visit: ASCCP guidelines, Gardisil vaccination, STD testing breast self exam, use and side effects of OCPs, exercise and healthy diet  Patient to return to office in yearly for annual exam      All questions have been answered to her satisfaction  CC:  Annual Gynecologic Examination  Chief Complaint   Patient presents with   • Gynecologic Exam     Pt is here for her annual exam; pap is up to date  Last pap 3/30/21 neg pap        HPI: Delicia Funez is a 21 y o  [place] Reyes who presents for annual gynecologic examination  She has the following concerns:  none      Health Maintenance:    Exercise: intermittently  Breast exams/breast awareness: yes  Diet: well balanced diet      History reviewed  No pertinent past medical history  Past Surgical History:   Procedure Laterality Date   • WISDOM TOOTH EXTRACTION         Past OB/Gyn History:  Period Cycle (Days): 30  Period Duration (Days): 3-4  Period Pattern: Regular  Menstrual Flow: Light, Moderate  Dysmenorrhea: (!) Mild  Dysmenorrhea Symptoms: CrampingPatient's last menstrual period was 03/08/2023  Last Pap: 2021 : no abnormalities  History of abnormal Pap smear: no  HPV vaccine completed: no    Patient is not currently sexually active     STD testing: yes  Current contraception: OCP (estrogen/progesterone)      Family History  Family History   Problem Relation Age of Onset   • Migraines Mother    • Hypertension Father    • Liver disease Maternal Grandfather        Family history of uterine or ovarian cancer: "no  Family history of breast cancer: no  Family history of colon cancer: no    Social History:  Social History     Socioeconomic History   • Marital status: Single     Spouse name: Not on file   • Number of children: Not on file   • Years of education: Not on file   • Highest education level: Not on file   Occupational History   • Not on file   Tobacco Use   • Smoking status: Never   • Smokeless tobacco: Never   Vaping Use   • Vaping Use: Never used   Substance and Sexual Activity   • Alcohol use: Yes     Comment: social   • Drug use: No   • Sexual activity: Not Currently     Partners: Male     Birth control/protection: OCP   Other Topics Concern   • Not on file   Social History Narrative   • Not on file     Social Determinants of Health     Financial Resource Strain: Not on file   Food Insecurity: Not on file   Transportation Needs: Not on file   Physical Activity: Not on file   Stress: Not on file   Social Connections: Not on file   Intimate Partner Violence: Not on file   Housing Stability: Not on file     Domestic violence screen: negative    Allergies:  No Known Allergies    Medications:    Current Outpatient Medications:   •  drospirenone-ethinyl estradiol (Lo-Zumandimine) 3-0 02 MG per tablet, Take 1 tablet by mouth daily, Disp: 84 tablet, Rfl: 3  •  EPINEPHrine (EPIPEN) 0 3 mg/0 3 mL SOAJ, INJECT THE CONTENTS OF 1 PEN (0 3ML) INTO THE MUSCLE AS NEEDED, Disp: , Rfl:   •  silver sulfadiazine (SILVADENE,SSD) 1 % cream, APPLY TOPICALLY ONCE DAILY FOR 5 DAYS, Disp: , Rfl:     Review of Systems:  Review of Systems   Constitutional: Negative  HENT: Negative  Respiratory: Negative  Cardiovascular: Negative  Gastrointestinal: Negative  Genitourinary: Negative  Neurological: Negative  Psychiatric/Behavioral: Negative            Physical Exam:  /68   Ht 5' 4\" (1 626 m)   Wt 49 4 kg (109 lb)   LMP 03/08/2023   BMI 18 71 kg/m²    Physical Exam  Constitutional:       Appearance: Normal " appearance  Genitourinary:      Bladder and urethral meatus normal       No lesions in the vagina  Right Labia: No rash, tenderness, lesions, skin changes or Bartholin's cyst      Left Labia: No tenderness, lesions, skin changes, Bartholin's cyst or rash  No vaginal erythema, tenderness or bleeding  Right Adnexa: not tender, not full and no mass present  Left Adnexa: not tender, not full and no mass present  Cervix is nulliparous  No cervical motion tenderness, discharge or lesion  Uterus is not enlarged, fixed or tender  No uterine mass detected  Urethral meatus caruncle not present  No urethral tenderness or mass present  Breasts:     Right: No swelling, bleeding, inverted nipple, mass, nipple discharge, skin change or tenderness  Left: No swelling, bleeding, inverted nipple, mass, nipple discharge, skin change or tenderness  HENT:      Head: Normocephalic and atraumatic  Eyes:      Extraocular Movements: Extraocular movements intact  Conjunctiva/sclera: Conjunctivae normal       Pupils: Pupils are equal, round, and reactive to light  Cardiovascular:      Rate and Rhythm: Normal rate and regular rhythm  Heart sounds: Normal heart sounds  No murmur heard  Pulmonary:      Effort: Pulmonary effort is normal  No respiratory distress  Breath sounds: Normal breath sounds  No wheezing or rales  Abdominal:      General: There is no distension  Palpations: Abdomen is soft  Tenderness: There is no abdominal tenderness  There is no guarding  Neurological:      General: No focal deficit present  Mental Status: She is alert and oriented to person, place, and time  Skin:     General: Skin is warm and dry  Psychiatric:         Mood and Affect: Mood normal          Behavior: Behavior normal    Vitals and nursing note reviewed

## 2023-04-06 LAB
C TRACH DNA SPEC QL NAA+PROBE: NEGATIVE
N GONORRHOEA DNA SPEC QL NAA+PROBE: NEGATIVE

## 2023-08-05 ENCOUNTER — OFFICE VISIT (OUTPATIENT)
Dept: URGENT CARE | Age: 23
End: 2023-08-05
Payer: COMMERCIAL

## 2023-08-05 VITALS
OXYGEN SATURATION: 99 % | SYSTOLIC BLOOD PRESSURE: 118 MMHG | HEART RATE: 100 BPM | DIASTOLIC BLOOD PRESSURE: 62 MMHG | RESPIRATION RATE: 18 BRPM | TEMPERATURE: 98.6 F

## 2023-08-05 DIAGNOSIS — R09.81 SINUS CONGESTION: Primary | ICD-10-CM

## 2023-08-05 DIAGNOSIS — R21 RASH: ICD-10-CM

## 2023-08-05 PROCEDURE — G0382 LEV 3 HOSP TYPE B ED VISIT: HCPCS | Performed by: NURSE PRACTITIONER

## 2023-08-05 NOTE — PROGRESS NOTES
[facility]        NAME: Crystal Rangel is a 21 y.o. female  : 2000    MRN: [id number]  DATE: 2023  TIME: 10:34 AM    Assessment and Plan   Sinus congestion [R09.81]  1. Sinus congestion        2. Rash              Patient Instructions     Warm compress on the eye lid  Mucinex BID or Claritin 10 mg daily, OTC prn  Tylenol or motrin OTC prn for pain  Follow up with PCP in 3-5 days. Proceed to  ER if symptoms worsen. Chief Complaint     Chief Complaint   Patient presents with   • Eye Pain   • Nausea   • Headache   • Back Pain     Patient thinks she was bite by a spider near her right eye- now having eye pain, headache, nausea and mid back pain- this occurred 2 days ago         History of Present Illness       HPI   Presents to the clinic with c/o rash on the right upper eyelid and she thinks it may be a spider bite but she is not sure. Also mucus in the back of her throat, with some discomfort on the right side of the face. She had a bit of back pain but that resolved. Duration of eye symptoms and cold symptoms, x 2 days    Review of Systems   Review of Systems   Constitutional: Negative for fever. HENT: Positive for sinus pressure. Negative for sore throat. Eyes: Negative for discharge, redness, itching and visual disturbance. Rash on the eyelid, right, x 2   Respiratory: Negative for shortness of breath. Gastrointestinal: Positive for nausea (from swallowing mucus in the throat). Negative for abdominal distention, abdominal pain and vomiting. Musculoskeletal: Positive for back pain (resolved). Neurological: Negative for headaches.          Current Medications       Current Outpatient Medications:   •  drospirenone-ethinyl estradiol (Lo-Zumandimine) 3-0.02 MG per tablet, Take 1 tablet by mouth daily, Disp: 84 tablet, Rfl: 3  •  EPINEPHrine (EPIPEN) 0.3 mg/0.3 mL SOAJ, INJECT THE CONTENTS OF 1 PEN (0.3ML) INTO THE MUSCLE AS NEEDED, Disp: , Rfl:   •  silver sulfadiazine (SILVADENE,SSD) 1 % cream, APPLY TOPICALLY ONCE DAILY FOR 5 DAYS, Disp: , Rfl:     Current Allergies     Allergies as of 08/05/2023   • (No Known Allergies)            The following portions of the patient's history were reviewed and updated as appropriate: allergies, current medications, past family history, past medical history, past social history, past surgical history and problem list.     No past medical history on file. Past Surgical History:   Procedure Laterality Date   • WISDOM TOOTH EXTRACTION         Family History   Problem Relation Age of Onset   • Migraines Mother    • Hypertension Father    • Liver disease Maternal Grandfather          Medications have been verified. Objective   /62 (BP Location: Right arm, Patient Position: Sitting, Cuff Size: Standard)   Pulse 100   Temp 98.6 °F (37 °C)   Resp 18   SpO2 99%   No LMP recorded. Physical Exam     Physical Exam  Constitutional:       Appearance: She is not ill-appearing or diaphoretic. HENT:      Head:      Comments: Mild discomfort with palpation of the maxillary sinuses     Right Ear: Tympanic membrane normal.      Left Ear: Tympanic membrane normal.      Nose: Rhinorrhea present. Mouth/Throat:      Pharynx: No posterior oropharyngeal erythema. Comments: Post nasal drip  Cardiovascular:      Rate and Rhythm: Regular rhythm. Heart sounds: Normal heart sounds. Pulmonary:      Effort: Pulmonary effort is normal.      Breath sounds: Normal breath sounds.

## 2023-12-24 ENCOUNTER — OFFICE VISIT (OUTPATIENT)
Dept: URGENT CARE | Age: 23
End: 2023-12-24
Payer: COMMERCIAL

## 2023-12-24 VITALS
OXYGEN SATURATION: 100 % | SYSTOLIC BLOOD PRESSURE: 110 MMHG | TEMPERATURE: 99.1 F | RESPIRATION RATE: 18 BRPM | HEART RATE: 128 BPM | DIASTOLIC BLOOD PRESSURE: 78 MMHG

## 2023-12-24 DIAGNOSIS — J02.8 SORE THROAT (VIRAL): Primary | ICD-10-CM

## 2023-12-24 DIAGNOSIS — B97.89 SORE THROAT (VIRAL): Primary | ICD-10-CM

## 2023-12-24 DIAGNOSIS — R05.1 ACUTE COUGH: ICD-10-CM

## 2023-12-24 LAB
SARS-COV-2 AG UPPER RESP QL IA: NEGATIVE
VALID CONTROL: NORMAL

## 2023-12-24 PROCEDURE — G0382 LEV 3 HOSP TYPE B ED VISIT: HCPCS

## 2023-12-24 PROCEDURE — 87636 SARSCOV2 & INF A&B AMP PRB: CPT

## 2023-12-24 PROCEDURE — 87811 SARS-COV-2 COVID19 W/OPTIC: CPT

## 2023-12-24 RX ORDER — BENZONATATE 200 MG/1
200 CAPSULE ORAL 3 TIMES DAILY PRN
Qty: 20 CAPSULE | Refills: 0 | Status: SHIPPED | OUTPATIENT
Start: 2023-12-24

## 2023-12-24 NOTE — PROGRESS NOTES
[facility]        NAME: Alexus Velez is a 23 y.o. female  : 2000    MRN: [id number]  DATE: 2023  TIME: 4:12 PM    Assessment and Plan   Sore throat (viral) [J02.8, B97.89]  1. Sore throat (viral)  Covid/Flu-Office Collect      2. Acute cough  benzonatate (TESSALON) 200 MG capsule    Poct Covid 19 Rapid Antigen Test        COVID antigen negative, pending COVID and flu PCR.    Patient Instructions     Please allow 24 to 48 hours for COVID and flu test result.  If COVID test is positive, please quarantine for 5 days.  If flu test positive, please quarantine till you are fever free for 24 hours without fever reducing medication.  Please  alternate ibuprofen and Tylenol as needed for fever.  Please trial warm salt water gargles, Chloraseptic spray, Cepacol cough drops and warm tea with honey as needed for sore throat.  Please trial Tessalon every 8 hours as needed for cough.   Please trial OTC cough and cold medication.   Drink plenty of fluids.   Follow up with PCP in 3-5 days.  Proceed to  ER if symptoms worsen.    Chief Complaint     Chief Complaint   Patient presents with    Back Pain    Fatigue    Generalized Body Aches    Sore Throat     Symptoms started 2 days ago         History of Present Illness       23-year-old female presenting for evaluation of viral symptoms.  Patient states today she developed chills, fatigue, congestion, sore throat, cough, back pain and bodyaches.  She denies any current treatment.  States her dad was recently ill with similar symptoms.  She denies any chest pain, measured fevers, shortness of breath, nausea, vomiting or diarrhea.    Back Pain  Pertinent negatives include no chest pain or fever.   Fatigue  Associated symptoms include chills, congestion, coughing, fatigue, myalgias and a sore throat. Pertinent negatives include no chest pain, fever, nausea or vomiting.   Generalized Body Aches  Associated symptoms include congestion, a sore throat, fatigue  and coughing. Pertinent negatives include no fever, chest pain, shortness of breath, diarrhea, nausea or vomiting.   Sore Throat   Associated symptoms include congestion and coughing. Pertinent negatives include no diarrhea, shortness of breath or vomiting.       Review of Systems   Review of Systems   Constitutional:  Positive for chills and fatigue. Negative for fever.   HENT:  Positive for congestion and sore throat.    Respiratory:  Positive for cough. Negative for shortness of breath.    Cardiovascular:  Negative for chest pain.   Gastrointestinal:  Negative for diarrhea, nausea and vomiting.   Musculoskeletal:  Positive for back pain and myalgias.   All other systems reviewed and are negative.        Current Medications       Current Outpatient Medications:     benzonatate (TESSALON) 200 MG capsule, Take 1 capsule (200 mg total) by mouth 3 (three) times a day as needed for cough, Disp: 20 capsule, Rfl: 0    drospirenone-ethinyl estradiol (Lo-Zumandimine) 3-0.02 MG per tablet, Take 1 tablet by mouth daily, Disp: 84 tablet, Rfl: 3    EPINEPHrine (EPIPEN) 0.3 mg/0.3 mL SOAJ, INJECT THE CONTENTS OF 1 PEN (0.3ML) INTO THE MUSCLE AS NEEDED, Disp: , Rfl:     silver sulfadiazine (SILVADENE,SSD) 1 % cream, APPLY TOPICALLY ONCE DAILY FOR 5 DAYS, Disp: , Rfl:     Current Allergies     Allergies as of 12/24/2023    (No Known Allergies)            The following portions of the patient's history were reviewed and updated as appropriate: allergies, current medications, past family history, past medical history, past social history, past surgical history and problem list.     No past medical history on file.    Past Surgical History:   Procedure Laterality Date    WISDOM TOOTH EXTRACTION         Family History   Problem Relation Age of Onset    Migraines Mother     Hypertension Father     Liver disease Maternal Grandfather          Medications have been verified.        Objective   /78   Pulse (!) 128   Temp 99.1 °F  (37.3 °C)   Resp 18   SpO2 100%        Physical Exam     Physical Exam  Vitals and nursing note reviewed.   Constitutional:       General: She is not in acute distress.     Appearance: Normal appearance. She is normal weight. She is not ill-appearing, toxic-appearing or diaphoretic.   HENT:      Head: Normocephalic and atraumatic.      Right Ear: Tympanic membrane normal.      Left Ear: Tympanic membrane normal.      Nose: Congestion present. No rhinorrhea.      Mouth/Throat:      Mouth: Mucous membranes are moist.      Pharynx: Oropharynx is clear. Posterior oropharyngeal erythema present. No oropharyngeal exudate.   Eyes:      General:         Right eye: No discharge.         Left eye: No discharge.   Cardiovascular:      Rate and Rhythm: Normal rate and regular rhythm.      Pulses: Normal pulses.      Heart sounds: Normal heart sounds. No murmur heard.     No friction rub. No gallop.   Pulmonary:      Effort: Pulmonary effort is normal. No respiratory distress.      Breath sounds: Normal breath sounds. No stridor. No wheezing, rhonchi or rales.   Chest:      Chest wall: No tenderness.   Abdominal:      General: Bowel sounds are normal.      Palpations: Abdomen is soft.      Tenderness: There is no abdominal tenderness.   Skin:     General: Skin is warm and dry.   Neurological:      Mental Status: She is alert.   Psychiatric:         Mood and Affect: Mood normal.         Behavior: Behavior normal.

## 2023-12-24 NOTE — PATIENT INSTRUCTIONS
Please allow 24 to 48 hours for COVID and flu test result.  If COVID test is positive, please quarantine for 5 days.  If flu test positive, please quarantine till you are fever free for 24 hours without fever reducing medication.  Please  alternate ibuprofen and Tylenol as needed for fever.  Please trial warm salt water gargles, Chloraseptic spray, Cepacol cough drops and warm tea with honey as needed for sore throat.  Please trial Tessalon every 8 hours as needed for cough.   Please trial OTC cough and cold medication.   Drink plenty of fluids.

## 2023-12-25 LAB
FLUAV RNA RESP QL NAA+PROBE: POSITIVE
FLUBV RNA RESP QL NAA+PROBE: NEGATIVE
SARS-COV-2 RNA RESP QL NAA+PROBE: NEGATIVE

## 2023-12-28 ENCOUNTER — HOSPITAL ENCOUNTER (EMERGENCY)
Facility: HOSPITAL | Age: 23
Discharge: HOME | End: 2023-12-28
Attending: EMERGENCY MEDICINE
Payer: COMMERCIAL

## 2023-12-28 VITALS
RESPIRATION RATE: 18 BRPM | SYSTOLIC BLOOD PRESSURE: 130 MMHG | HEART RATE: 112 BPM | DIASTOLIC BLOOD PRESSURE: 77 MMHG | TEMPERATURE: 98 F | OXYGEN SATURATION: 97 %

## 2023-12-28 DIAGNOSIS — E87.6 HYPOKALEMIA: ICD-10-CM

## 2023-12-28 DIAGNOSIS — R04.0 EPISTAXIS: Primary | ICD-10-CM

## 2023-12-28 LAB
ABO GROUP BLD: NORMAL
ANION GAP SERPL CALCULATED.3IONS-SCNC: 10 MMOL/L
APTT PPP: 28 SECONDS (ref 23–37)
BASOPHILS # BLD AUTO: 0.01 THOUSANDS/ÂΜL (ref 0–0.1)
BASOPHILS NFR BLD AUTO: 0 % (ref 0–1)
BLD GP AB SCN SERPL QL: NEGATIVE
BUN SERPL-MCNC: 6 MG/DL (ref 5–25)
CALCIUM SERPL-MCNC: 9 MG/DL (ref 8.4–10.2)
CHLORIDE SERPL-SCNC: 101 MMOL/L (ref 96–108)
CO2 SERPL-SCNC: 25 MMOL/L (ref 21–32)
CREAT SERPL-MCNC: 0.54 MG/DL (ref 0.6–1.3)
EOSINOPHIL # BLD AUTO: 0.02 THOUSAND/ÂΜL (ref 0–0.61)
EOSINOPHIL NFR BLD AUTO: 0 % (ref 0–6)
ERYTHROCYTE [DISTWIDTH] IN BLOOD BY AUTOMATED COUNT: 12 % (ref 11.6–15.1)
GFR SERPL CREATININE-BSD FRML MDRD: 133 ML/MIN/1.73SQ M
GLUCOSE SERPL-MCNC: 107 MG/DL (ref 65–140)
HCG SERPL QL: NEGATIVE
HCT VFR BLD AUTO: 38.7 % (ref 34.8–46.1)
HGB BLD-MCNC: 13 G/DL (ref 11.5–15.4)
IMM GRANULOCYTES # BLD AUTO: 0.02 THOUSAND/UL (ref 0–0.2)
IMM GRANULOCYTES NFR BLD AUTO: 0 % (ref 0–2)
INR PPP: 0.97 (ref 0.84–1.19)
LYMPHOCYTES # BLD AUTO: 1.22 THOUSANDS/ÂΜL (ref 0.6–4.47)
LYMPHOCYTES NFR BLD AUTO: 19 % (ref 14–44)
MCH RBC QN AUTO: 28.7 PG (ref 26.8–34.3)
MCHC RBC AUTO-ENTMCNC: 33.6 G/DL (ref 31.4–37.4)
MCV RBC AUTO: 85 FL (ref 82–98)
MONOCYTES # BLD AUTO: 0.71 THOUSAND/ÂΜL (ref 0.17–1.22)
MONOCYTES NFR BLD AUTO: 11 % (ref 4–12)
NEUTROPHILS # BLD AUTO: 4.41 THOUSANDS/ÂΜL (ref 1.85–7.62)
NEUTS SEG NFR BLD AUTO: 70 % (ref 43–75)
NRBC BLD AUTO-RTO: 0 /100 WBCS
PLATELET # BLD AUTO: 185 THOUSANDS/UL (ref 149–390)
PMV BLD AUTO: 9.7 FL (ref 8.9–12.7)
POTASSIUM SERPL-SCNC: 3.2 MMOL/L (ref 3.5–5.3)
PROTHROMBIN TIME: 13.5 SECONDS (ref 11.6–14.5)
RBC # BLD AUTO: 4.53 MILLION/UL (ref 3.81–5.12)
RH BLD: POSITIVE
SODIUM SERPL-SCNC: 136 MMOL/L (ref 135–147)
SPECIMEN EXPIRATION DATE: NORMAL
WBC # BLD AUTO: 6.39 THOUSAND/UL (ref 4.31–10.16)

## 2023-12-28 PROCEDURE — 86901 BLOOD TYPING SEROLOGIC RH(D): CPT

## 2023-12-28 PROCEDURE — 85025 COMPLETE CBC W/AUTO DIFF WBC: CPT

## 2023-12-28 PROCEDURE — 30901 CONTROL OF NOSEBLEED: CPT | Performed by: EMERGENCY MEDICINE

## 2023-12-28 PROCEDURE — 85730 THROMBOPLASTIN TIME PARTIAL: CPT

## 2023-12-28 PROCEDURE — 96374 THER/PROPH/DIAG INJ IV PUSH: CPT

## 2023-12-28 PROCEDURE — 80048 BASIC METABOLIC PNL TOTAL CA: CPT

## 2023-12-28 PROCEDURE — 36415 COLL VENOUS BLD VENIPUNCTURE: CPT

## 2023-12-28 PROCEDURE — 85610 PROTHROMBIN TIME: CPT

## 2023-12-28 PROCEDURE — 86850 RBC ANTIBODY SCREEN: CPT

## 2023-12-28 PROCEDURE — 84703 CHORIONIC GONADOTROPIN ASSAY: CPT

## 2023-12-28 PROCEDURE — 86900 BLOOD TYPING SEROLOGIC ABO: CPT

## 2023-12-28 PROCEDURE — 99284 EMERGENCY DEPT VISIT MOD MDM: CPT | Performed by: EMERGENCY MEDICINE

## 2023-12-28 PROCEDURE — 99283 EMERGENCY DEPT VISIT LOW MDM: CPT

## 2023-12-28 RX ORDER — POTASSIUM CHLORIDE 20 MEQ/1
40 TABLET, EXTENDED RELEASE ORAL ONCE
Status: DISCONTINUED | OUTPATIENT
Start: 2023-12-28 | End: 2023-12-28 | Stop reason: HOSPADM

## 2023-12-28 RX ORDER — TRANEXAMIC ACID 100 MG/ML
1000 INJECTION, SOLUTION INTRAVENOUS ONCE
Status: COMPLETED | OUTPATIENT
Start: 2023-12-28 | End: 2023-12-28

## 2023-12-28 RX ORDER — LIDOCAINE HYDROCHLORIDE AND EPINEPHRINE 10; 10 MG/ML; UG/ML
1 INJECTION, SOLUTION INFILTRATION; PERINEURAL ONCE
Status: DISCONTINUED | OUTPATIENT
Start: 2023-12-28 | End: 2023-12-28 | Stop reason: HOSPADM

## 2023-12-28 RX ORDER — ONDANSETRON 2 MG/ML
4 INJECTION INTRAMUSCULAR; INTRAVENOUS ONCE
Status: COMPLETED | OUTPATIENT
Start: 2023-12-28 | End: 2023-12-28

## 2023-12-28 RX ORDER — OXYMETAZOLINE HYDROCHLORIDE 0.05 G/100ML
2 SPRAY NASAL ONCE
Status: COMPLETED | OUTPATIENT
Start: 2023-12-28 | End: 2023-12-28

## 2023-12-28 RX ORDER — LANOLIN ALCOHOL/MO/W.PET/CERES
400 CREAM (GRAM) TOPICAL ONCE
Status: DISCONTINUED | OUTPATIENT
Start: 2023-12-28 | End: 2023-12-28 | Stop reason: HOSPADM

## 2023-12-28 RX ADMIN — OXYMETAZOLINE HYDROCHLORIDE 2 SPRAY: 0.05 SPRAY NASAL at 04:41

## 2023-12-28 RX ADMIN — ONDANSETRON 4 MG: 2 INJECTION INTRAMUSCULAR; INTRAVENOUS at 04:15

## 2023-12-28 RX ADMIN — TRANEXAMIC ACID 1000 MG: 100 INJECTION, SOLUTION INTRAVENOUS at 04:15

## 2023-12-28 NOTE — Clinical Note
Alexus Velez was seen and treated in our emergency department on 12/28/2023.    No restrictions            Diagnosis:     Alexus  .    She may return on this date: 12/30/2023         If you have any questions or concerns, please don't hesitate to call.      Paolo Andrea MD    ______________________________           _______________          _______________  Hospital Representative                              Date                                Time

## 2023-12-28 NOTE — ED ATTENDING ATTESTATION
12/28/2023  I, Abram Mcgowan MD, saw and evaluated the patient. I have discussed the patient with the resident/non-physician practitioner and agree with the resident's/non-physician practitioner's findings, Plan of Care, and MDM as documented in the resident's/non-physician practitioner's note, except where noted. All available labs and Radiology studies were reviewed.  I was present for key portions of any procedure(s) performed by the resident/non-physician practitioner and I was immediately available to provide assistance.       At this point I agree with the current assessment done in the Emergency Department.  I have conducted an independent evaluation of this patient a history and physical is as follows:  Patient is a 23 year old female with intermittent worsening right sided nosebleed tonight. No easy bruising. No anticoagulant use. Has had cough. No trauma. LMP - last month. Was last seen at [facility] in [place] on 12/24/23 for sore throat and influenza A. [facility] website checked on this patient and no Rx found. NCAT. (+) R sided epistaxis noted. Oropharynx clear. Neck supple. Tachycardia without murmur. Lungs clear. Abdomen soft and nontender. Good bowel sounds. No rash noted. No conjunctival pallor. No pallor. Differential diagnosis including but not limited to: anterior epistaxis, posterior epistaxis, sinus etiology, tumor, anemia, bleeding diathesis, coagulopathy, hypertensive urgency, influenza A. Will check labs and try TXA to stop epistaxis.     ED Course         Critical Care Time  Procedures

## 2023-12-28 NOTE — ED PROVIDER NOTES
"History  Chief Complaint   Patient presents with    Nose Bleed     Patient is dx with flu. Having several nose bleeds. Reports earlier today bleeding for 45 minutes and now spitting up blood. Patient states when she was swabbed her nose hurt a lot. Patient appears pale and mom states patient is \"acting funny\"     23-year-old female with no significant past medical history presents with nosebleed.  Patient states 1 day history of nosebleed from the right nare with episode earlier today lasting approximately 45 minutes with repeat episode prior to arrival which was noted to be gushing in nature for the past 20 to 30 minutes.  Recently diagnosed with influenza and persistent cough.  Previous history of nosebleeds.  Associated fatigue, nausea, and cough.  Denies fevers, chills, vomiting, trauma, history of coagulopathy, neck pain, syncope.         Nose Bleed      Prior to Admission Medications   Prescriptions Last Dose Informant Patient Reported? Taking?   EPINEPHrine (EPIPEN) 0.3 mg/0.3 mL SOAJ  Self Yes No   Sig: INJECT THE CONTENTS OF 1 PEN (0.3ML) INTO THE MUSCLE AS NEEDED   benzonatate (TESSALON) 200 MG capsule   No No   Sig: Take 1 capsule (200 mg total) by mouth 3 (three) times a day as needed for cough   drospirenone-ethinyl estradiol (Lo-Zumandimine) 3-0.02 MG per tablet   No No   Sig: Take 1 tablet by mouth daily   silver sulfadiazine (SILVADENE,SSD) 1 % cream  Self Yes No   Sig: APPLY TOPICALLY ONCE DAILY FOR 5 DAYS      Facility-Administered Medications: None       History reviewed. No pertinent past medical history.    Past Surgical History:   Procedure Laterality Date    WISDOM TOOTH EXTRACTION         Family History   Problem Relation Age of Onset    Migraines Mother     Hypertension Father     Liver disease Maternal Grandfather      I have reviewed and agree with the history as documented.    E-Cigarette/Vaping    E-Cigarette Use Never User      E-Cigarette/Vaping Substances    Nicotine No     THC No     " CBD No     Flavoring No     Other No     Unknown No      Social History     Tobacco Use    Smoking status: Never    Smokeless tobacco: Never   Vaping Use    Vaping status: Never Used   Substance Use Topics    Alcohol use: Yes     Comment: social    Drug use: No        Review of Systems   HENT:  Positive for nosebleeds.    All other systems reviewed and are negative.      Physical Exam  ED Triage Vitals   Temperature Pulse Respirations Blood Pressure SpO2   12/28/23 0331 12/28/23 0330 12/28/23 0330 12/28/23 0330 12/28/23 0330   98 °F (36.7 °C) (!) 112 18 130/77 97 %      Temp Source Heart Rate Source Patient Position - Orthostatic VS BP Location FiO2 (%)   12/28/23 0331 12/28/23 0330 12/28/23 0330 12/28/23 0330 --   Oral Monitor Lying Right arm       Pain Score       --                    Orthostatic Vital Signs  Vitals:    12/28/23 0330   BP: 130/77   Pulse: (!) 112   Patient Position - Orthostatic VS: Lying       Physical Exam  Vitals and nursing note reviewed.   Constitutional:       General: She is not in acute distress.     Appearance: Normal appearance. She is normal weight. She is not ill-appearing, toxic-appearing or diaphoretic.      Comments: Airway patent and speaking in full sentences.    HENT:      Head: Normocephalic and atraumatic.      Right Ear: External ear normal.      Left Ear: External ear normal.      Nose: Nose normal. No congestion or rhinorrhea.      Comments: Profuse bleeding from right nare, unable to visualize source after clearing nare.      Mouth/Throat:      Mouth: Mucous membranes are moist.      Pharynx: Oropharynx is clear. No oropharyngeal exudate or posterior oropharyngeal erythema.      Comments: Clotted blood in the posterior oropharynx.   Eyes:      General: No scleral icterus.        Right eye: No discharge.         Left eye: No discharge.      Extraocular Movements: Extraocular movements intact.      Conjunctiva/sclera: Conjunctivae normal.   Cardiovascular:      Rate and  Rhythm: Normal rate and regular rhythm.      Pulses: Normal pulses.   Pulmonary:      Effort: Pulmonary effort is normal. No respiratory distress.   Musculoskeletal:      Cervical back: Normal range of motion and neck supple. No tenderness.   Skin:     General: Skin is warm and dry.      Capillary Refill: Capillary refill takes less than 2 seconds.      Coloration: Skin is not cyanotic, jaundiced or pale.      Findings: No bruising, erythema, lesion, petechiae or rash.   Neurological:      General: No focal deficit present.      Mental Status: She is alert and oriented to person, place, and time. Mental status is at baseline.         ED Medications  Medications   lidocaine-epinephrine (XYLOCAINE/EPINEPHRINE) 1 %-1:100,000 injection 1 mL (has no administration in time range)   potassium chloride (K-DUR,KLOR-CON) CR tablet 40 mEq (has no administration in time range)   magnesium Oxide (MAG-OX) tablet 400 mg (has no administration in time range)   tranexamic acid 100mg/mL (for epistaxis) 1,000 mg (1,000 mg Nasal Given by Other 12/28/23 0415)   ondansetron (ZOFRAN) injection 4 mg (4 mg Intravenous Given 12/28/23 0415)   oxymetazoline (AFRIN) 0.05 % nasal spray 2 spray (2 sprays Each Nare Given 12/28/23 0441)       Diagnostic Studies  Results Reviewed       Procedure Component Value Units Date/Time    hCG, qualitative pregnancy [[id number]] Collected: 12/28/23 0414    Lab Status: In process Specimen: Blood from Arm, Left Updated: 12/28/23 0514    Basic metabolic panel [[id number]]  (Abnormal) Collected: 12/28/23 0414    Lab Status: Final result Specimen: Blood from Arm, Left Updated: 12/28/23 0459     Sodium 136 mmol/L      Potassium 3.2 mmol/L      Chloride 101 mmol/L      CO2 25 mmol/L      ANION GAP 10 mmol/L      BUN 6 mg/dL      Creatinine 0.54 mg/dL      Glucose 107 mg/dL      Calcium 9.0 mg/dL      eGFR 133 ml/min/1.73sq m     Narrative:      National Kidney Disease Foundation guidelines for Chronic Kidney Disease  (CKD):     Stage 1 with normal or high GFR (GFR > 90 mL/min/1.73 square meters)    Stage 2 Mild CKD (GFR = 60-89 mL/min/1.73 square meters)    Stage 3A Moderate CKD (GFR = 45-59 mL/min/1.73 square meters)    Stage 3B Moderate CKD (GFR = 30-44 mL/min/1.73 square meters)    Stage 4 Severe CKD (GFR = 15-29 mL/min/1.73 square meters)    Stage 5 End Stage CKD (GFR <15 mL/min/1.73 square meters)  Note: GFR calculation is accurate only with a steady state creatinine    Protime-INR [[id number]]  (Normal) Collected: 12/28/23 0414    Lab Status: Final result Specimen: Blood from Arm, Left Updated: 12/28/23 0443     Protime 13.5 seconds      INR 0.97    APTT [[id number]]  (Normal) Collected: 12/28/23 0414    Lab Status: Final result Specimen: Blood from Arm, Left Updated: 12/28/23 0443     PTT 28 seconds     CBC and differential [[id number]] Collected: 12/28/23 0414    Lab Status: Final result Specimen: Blood from Arm, Left Updated: 12/28/23 0431     WBC 6.39 Thousand/uL      RBC 4.53 Million/uL      Hemoglobin 13.0 g/dL      Hematocrit 38.7 %      MCV 85 fL      MCH 28.7 pg      MCHC 33.6 g/dL      RDW 12.0 %      MPV 9.7 fL      Platelets 185 Thousands/uL      nRBC 0 /100 WBCs      Neutrophils Relative 70 %      Immat GRANS % 0 %      Lymphocytes Relative 19 %      Monocytes Relative 11 %      Eosinophils Relative 0 %      Basophils Relative 0 %      Neutrophils Absolute 4.41 Thousands/µL      Immature Grans Absolute 0.02 Thousand/uL      Lymphocytes Absolute 1.22 Thousands/µL      Monocytes Absolute 0.71 Thousand/µL      Eosinophils Absolute 0.02 Thousand/µL      Basophils Absolute 0.01 Thousands/µL                    No orders to display         Procedures  Epistaxis management    Date/Time: 12/28/2023 4:00 AM    Performed by: Paolo Andrea MD  Authorized by: Paolo Andrea MD  Universal Protocol:  Procedure performed by:  Consent: Verbal consent obtained.  Risks and benefits: risks, benefits and alternatives were  discussed  Consent given by: patient  Patient understanding: patient states understanding of the procedure being performed  Patient consent: the patient's understanding of the procedure matches consent given  Procedure consent: procedure consent matches procedure scheduled  Relevant documents: relevant documents present and verified  Test results: test results available and properly labeled  Site marked: the operative site was marked  Patient identity confirmed: verbally with patient and arm band    Patient location:  ED  Procedure details:     Treatment site:  R anterior    Hemostasis method:  Anterior pack    Approach:  External    Spec Headlamp used: No      Treatment complexity:  Limited    Treatment episode: initial    Post-procedure details:     Assessment:  Bleeding stopped    Patient tolerance of procedure:  Tolerated well, no immediate complications        ED Course  ED Course as of 12/28/23 0532   Thu Dec 28, 2023   0443 Hemoglobin: 13.0   0459 Potassium(!): 3.2   0511 Bleeding has resolved. Patient feeling improved.                              SBIRT 20yo+      Flowsheet Row Most Recent Value   Initial Alcohol Screen: US AUDIT-C     1. How often do you have a drink containing alcohol? 0 Filed at: 12/28/2023 0451   2. How many drinks containing alcohol do you have on a typical day you are drinking?  0 Filed at: 12/28/2023 0451   3a. Male UNDER 65: How often do you have five or more drinks on one occasion? 0 Filed at: 12/28/2023 0451   3b. FEMALE Any Age, or MALE 65+: How often do you have 4 or more drinks on one occassion? 0 Filed at: 12/28/2023 0451   Audit-C Score 0 Filed at: 12/28/2023 0451   JIM: How many times in the past year have you...    Used an illegal drug or used a prescription medication for non-medical reasons? Never Filed at: 12/28/2023 0451                  Medical Decision Making  23-year-old female presents with nosebleed.  Differential includes but not limited to trauma, local  irritation, rhinitis sicca, anemia    CBC, coag, type and screen, hCG  Local TXA  Reassess in 15 minutes.    Bleeding has resolved after local TXA application.  No source identified on thorough expection.  Observation period of 1 hour with no continued bleeding.  Patient given Afrin spray.  Patient is otherwise well-appearing, no acute distress, resolution of bleeding, hemodynamically stable, hemoglobin of 13  Patient discharged home with Afrin.  Referral to ENT given.  Clinic information for ENT given.  Follow-up with PCP in 3 days.  Discharged home to self-care with strict return precautions.  Patient understanding and agreement with plan.    Amount and/or Complexity of Data Reviewed  Labs: ordered. Decision-making details documented in ED Course.    Risk  OTC drugs.  Prescription drug management.          Disposition  Final diagnoses:   Epistaxis   Hypokalemia     Time reflects when diagnosis was documented in both MDM as applicable and the Disposition within this note       Time User Action Codes Description Comment    12/28/2023  5:00 AM Paolo Andrea Add [R04.0] Epistaxis     12/28/2023  5:00 AM Paolo Andrea Add [E87.6] Hypokalemia           ED Disposition       ED Disposition   Discharge    Condition   Stable    Date/Time   Thu Dec 28, 2023 0515    Comment   Alexus Rosie discharge to home/self care.                   Follow-up Information       Follow up With Specialties Details Why Contact Info Additional Information    Mike Mac MD Internal Medicine Schedule an appointment as soon as possible for a visit in 3 days  [address] [id number]  [phone]       [facility] Emergency Medicine Go to  If symptoms worsen [address] [facility] [id number] [phone] [facility], [address] [facility], [place], [place], [address]    [facility] Otolaryngology Call   [phone]   [facility] [id number]             Patient's Medications   Discharge Prescriptions    No medications on file         PDMP Review         Value Time User    PDMP Reviewed  Yes 12/28/2023  3:18 AM Abram Mcgowan MD             ED Provider  Attending physically available and evaluated Alexus Velez. I managed the patient along with the ED Attending.    Electronically Signed by           Paolo Andrea MD  12/28/23 0523       Paolo Andrea MD  12/28/23 0531       Paolo Andrea MD  12/28/23 0532

## 2024-03-04 DIAGNOSIS — N92.3 INTERMENSTRUAL BLEEDING: ICD-10-CM

## 2024-03-04 DIAGNOSIS — Z30.41 ENCOUNTER FOR SURVEILLANCE OF CONTRACEPTIVE PILLS: ICD-10-CM

## 2024-03-04 RX ORDER — DROSPIRENONE AND ETHINYL ESTRADIOL 0.02-3(28)
1 KIT ORAL DAILY
Qty: 84 TABLET | Refills: 1 | Status: SHIPPED | OUTPATIENT
Start: 2024-03-04

## 2024-04-05 ENCOUNTER — ANNUAL EXAM (OUTPATIENT)
Dept: OBGYN CLINIC | Facility: CLINIC | Age: 24
End: 2024-04-05
Payer: COMMERCIAL

## 2024-04-05 VITALS
HEIGHT: 64 IN | BODY MASS INDEX: 19.12 KG/M2 | SYSTOLIC BLOOD PRESSURE: 102 MMHG | WEIGHT: 112 LBS | DIASTOLIC BLOOD PRESSURE: 70 MMHG

## 2024-04-05 DIAGNOSIS — Z01.419 WELL WOMAN EXAM WITH ROUTINE GYNECOLOGICAL EXAM: Primary | ICD-10-CM

## 2024-04-05 DIAGNOSIS — Z12.4 ENCOUNTER FOR SCREENING FOR MALIGNANT NEOPLASM OF CERVIX: ICD-10-CM

## 2024-04-05 DIAGNOSIS — N92.3 INTERMENSTRUAL BLEEDING: ICD-10-CM

## 2024-04-05 DIAGNOSIS — Z11.51 SCREENING FOR HPV (HUMAN PAPILLOMAVIRUS): ICD-10-CM

## 2024-04-05 DIAGNOSIS — Z30.41 ENCOUNTER FOR SURVEILLANCE OF CONTRACEPTIVE PILLS: ICD-10-CM

## 2024-04-05 PROCEDURE — 99395 PREV VISIT EST AGE 18-39: CPT

## 2024-04-05 PROCEDURE — G0145 SCR C/V CYTO,THINLAYER,RESCR: HCPCS

## 2024-04-05 RX ORDER — DROSPIRENONE AND ETHINYL ESTRADIOL 0.02-3(28)
1 KIT ORAL DAILY
Qty: 84 TABLET | Refills: 4 | Status: SHIPPED | OUTPATIENT
Start: 2024-04-05

## 2024-04-05 NOTE — PROGRESS NOTES
ASSESSMENT & PLAN:   Diagnoses and all orders for this visit:    Well woman exam with routine gynecological exam    Encounter for screening for malignant neoplasm of cervix  -     Liquid-based pap, screening    Screening for HPV (human papillomavirus)  -     Liquid-based pap, screening    Encounter for surveillance of contraceptive pills  -     drospirenone-ethinyl estradiol (Lo-Zumandimine) 3-0.02 MG per tablet; Take 1 tablet by mouth daily    Intermenstrual bleeding  -     drospirenone-ethinyl estradiol (Lo-Zumandimine) 3-0.02 MG per tablet; Take 1 tablet by mouth daily      The following were reviewed in today's visit: ASCCP guidelines, Gardisil vaccination, STD testing breast self exam, STD testing, use and side effects of OCPs, exercise, and healthy diet.    Patient to return to office in yearly for annual exam.     All questions have been answered to her satisfaction.    CC:  Annual Gynecologic Examination  Chief Complaint   Patient presents with    Gynecologic Exam     Annual exam, pap indicated. Pt is well, no concerns at this time.   neg pap 3/30/21       HPI: Alexus Velez is a 24 y.o.  who presents for annual gynecologic examination.  She has the following concerns:  none  Patient has not been vaccinated for HPV, she is interested in completing this in the future. She was given a pamphlet on Gardasil today.   Patient has been doing well on her OCP, no issues or concerns with this. She is getting a regular monthly period.   Has a balanced, healthy diet. She thinks she recently became allergic to fruit. She is planning to see an allergist for testing. Notes that her mouth swells and tingles when she eats fruit.     Health Maintenance:    Exercise: frequently  Breast exams/breast awareness: yes    Past Medical History:   Diagnosis Date    No pertinent past medical history        Past Surgical History:   Procedure Laterality Date    WISDOM TOOTH EXTRACTION         Past OB/Gyn History:  Period Cycle  (Days): 28  Period Duration (Days): 4-5  Period Pattern: Regular  Menstrual Flow: Heavy, Moderate, Light  Menstrual Control: Maxi pad, Thin padPatient's last menstrual period was 04/05/2024 (exact date).    Last Pap: 3/30/2021 : no abnormalities  History of abnormal Pap smear: no  HPV vaccine completed: no    Patient is not currently sexually active.   STD testing: no  Current contraception: OCP (estrogen/progesterone)    Family History  Family History   Problem Relation Age of Onset    Migraines Mother     Thyroid disease Mother     Hypertension Father     Liver disease Maternal Grandfather      Family history of uterine or ovarian cancer: no  Family history of breast cancer: no  Family history of colon cancer: no    Social History:  Social History     Socioeconomic History    Marital status: Single     Spouse name: Not on file    Number of children: Not on file    Years of education: Not on file    Highest education level: Not on file   Occupational History    Not on file   Tobacco Use    Smoking status: Never    Smokeless tobacco: Never   Vaping Use    Vaping status: Never Used   Substance and Sexual Activity    Alcohol use: Yes     Comment: social    Drug use: No    Sexual activity: Not Currently     Partners: Male     Birth control/protection: OCP   Other Topics Concern    Not on file   Social History Narrative    Not on file     Social Determinants of Health     Financial Resource Strain: Not on file   Food Insecurity: Not on file   Transportation Needs: Not on file   Physical Activity: Not on file   Stress: Not on file   Social Connections: Not on file   Intimate Partner Violence: Not on file   Housing Stability: Not on file     Domestic violence screen: negative    Allergies:  No Known Allergies    Medications:    Current Outpatient Medications:     drospirenone-ethinyl estradiol (Lo-Zumandimine) 3-0.02 MG per tablet, Take 1 tablet by mouth daily, Disp: 84 tablet, Rfl: 4    benzonatate (TESSALON) 200 MG  "capsule, Take 1 capsule (200 mg total) by mouth 3 (three) times a day as needed for cough (Patient not taking: Reported on 4/5/2024), Disp: 20 capsule, Rfl: 0    EPINEPHrine (EPIPEN) 0.3 mg/0.3 mL SOAJ, INJECT THE CONTENTS OF 1 PEN (0.3ML) INTO THE MUSCLE AS NEEDED (Patient not taking: Reported on 4/5/2024), Disp: , Rfl:     silver sulfadiazine (SILVADENE,SSD) 1 % cream, APPLY TOPICALLY ONCE DAILY FOR 5 DAYS (Patient not taking: Reported on 4/5/2024), Disp: , Rfl:     Review of Systems:  Review of Systems   Constitutional:  Negative for chills and fever.   Respiratory:  Negative for shortness of breath.    Cardiovascular:  Negative for chest pain.   Gastrointestinal:  Negative for abdominal pain, constipation and diarrhea.   Genitourinary:  Negative for dysuria, frequency, pelvic pain, vaginal discharge and vaginal pain.   Psychiatric/Behavioral:  Negative for self-injury and suicidal ideas.          Physical Exam:  /70 (BP Location: Left arm, Patient Position: Sitting, Cuff Size: Standard)   Ht 5' 4\" (1.626 m)   Wt 50.8 kg (112 lb)   LMP 04/05/2024 (Exact Date)   BMI 19.22 kg/m²    Physical Exam  Constitutional:       Appearance: Normal appearance.   Genitourinary:      Vulva and urethral meatus normal.      No labial fusion noted.      No vaginal erythema or tenderness.        Right Adnexa: not tender.     Left Adnexa: not tender.     No cervical discharge or friability.      Uterus is not tender.   Breasts:     Breasts are symmetrical.      Right: Normal.      Left: Normal.   HENT:      Head: Normocephalic and atraumatic.   Neck:      Thyroid: No thyroid mass or thyroid tenderness.   Cardiovascular:      Rate and Rhythm: Normal rate and regular rhythm.      Heart sounds: Normal heart sounds. No murmur heard.  Pulmonary:      Effort: Pulmonary effort is normal.      Breath sounds: Normal breath sounds. No wheezing.   Abdominal:      Palpations: Abdomen is soft. There is no mass.      Tenderness: There is " no abdominal tenderness.   Lymphadenopathy:      Cervical: No cervical adenopathy.   Neurological:      General: No focal deficit present.      Mental Status: She is alert and oriented to person, place, and time.   Skin:     General: Skin is warm and dry.   Psychiatric:         Mood and Affect: Mood normal.         Behavior: Behavior normal.   Vitals and nursing note reviewed.

## 2024-04-13 LAB
LAB AP GYN PRIMARY INTERPRETATION: NORMAL
Lab: NORMAL

## 2025-04-07 ENCOUNTER — ANNUAL EXAM (OUTPATIENT)
Dept: OBGYN CLINIC | Facility: CLINIC | Age: 25
End: 2025-04-07
Payer: COMMERCIAL

## 2025-04-07 VITALS
DIASTOLIC BLOOD PRESSURE: 70 MMHG | BODY MASS INDEX: 20.49 KG/M2 | WEIGHT: 120 LBS | HEIGHT: 64 IN | SYSTOLIC BLOOD PRESSURE: 110 MMHG

## 2025-04-07 DIAGNOSIS — N92.3 INTERMENSTRUAL BLEEDING: ICD-10-CM

## 2025-04-07 DIAGNOSIS — Z30.41 ENCOUNTER FOR SURVEILLANCE OF CONTRACEPTIVE PILLS: ICD-10-CM

## 2025-04-07 DIAGNOSIS — Z01.419 WELL WOMAN EXAM WITH ROUTINE GYNECOLOGICAL EXAM: Primary | ICD-10-CM

## 2025-04-07 PROCEDURE — 99395 PREV VISIT EST AGE 18-39: CPT

## 2025-04-07 RX ORDER — DROSPIRENONE AND ETHINYL ESTRADIOL 0.02-3(28)
1 KIT ORAL DAILY
Qty: 84 TABLET | Refills: 4 | Status: SHIPPED | OUTPATIENT
Start: 2025-04-07

## 2025-04-07 NOTE — PROGRESS NOTES
ASSESSMENT & PLAN:   Diagnoses and all orders for this visit:    Well woman exam with routine gynecological exam    Encounter for surveillance of contraceptive pills  -     drospirenone-ethinyl estradiol (Lo-Zumandimine) 3-0.02 MG per tablet; Take 1 tablet by mouth daily    Intermenstrual bleeding  -     drospirenone-ethinyl estradiol (Lo-Zumandimine) 3-0.02 MG per tablet; Take 1 tablet by mouth daily      The following were reviewed in today's visit: ASCCP guidelines, Gardisil vaccination, STD testing breast self exam, STD testing, exercise, and healthy diet.    Patient to return to office in yearly for annual exam.     All questions have been answered to her satisfaction.    CC:  Annual Gynecologic Examination  Chief Complaint   Patient presents with    Gynecologic Exam     The patient is here for a yearly exam. Last pap 2024 Nilm   neg pap 3/30/21  Regular periods.   No vaginal, bowel, bladder or breast problems.      HPI: Alexus Velez is a 25 y.o.  who presents for annual gynecologic examination.  She has the following concerns:  none. Doing well on OCPs.     Health Maintenance:    Exercise: intermittently, recently sprained her ankle  Breast exams/breast awareness: yes    Past Medical History:   Diagnosis Date    No pertinent past medical history      Past Surgical History:   Procedure Laterality Date    WISDOM TOOTH EXTRACTION       Past OB/Gyn History:   Patient's last menstrual period was 2025 (exact date).    Last Pap: 2024 : no abnormalities  History of abnormal Pap smear: no  HPV vaccine completed: no    Patient is currently sexually active.   STD testing: no  Current contraception:cOCP (estrogen/progesterone)    Family History  Family History   Problem Relation Age of Onset    Migraines Mother     Thyroid disease Mother     Hypertension Father     Liver disease Maternal Grandfather      Family history of uterine or ovarian cancer: no  Family history of breast cancer: no  Family  history of colon cancer: no    Social History:  Social History     Socioeconomic History    Marital status: Single     Spouse name: Not on file    Number of children: Not on file    Years of education: Not on file    Highest education level: Not on file   Occupational History    Not on file   Tobacco Use    Smoking status: Never    Smokeless tobacco: Never   Vaping Use    Vaping status: Never Used   Substance and Sexual Activity    Alcohol use: Not Currently     Comment: social    Drug use: No    Sexual activity: Not Currently     Partners: Male     Birth control/protection: OCP   Other Topics Concern    Not on file   Social History Narrative    Not on file     Social Drivers of Health     Financial Resource Strain: Not on file   Food Insecurity: Not on file   Transportation Needs: Not on file   Physical Activity: Not on file   Stress: Not on file   Social Connections: Not on file   Intimate Partner Violence: Not on file   Housing Stability: Not on file     Domestic violence screen: negative    Allergies:  No Known Allergies    Medications:    Current Outpatient Medications:     drospirenone-ethinyl estradiol (Lo-Zumandimine) 3-0.02 MG per tablet, Take 1 tablet by mouth daily, Disp: 84 tablet, Rfl: 4    benzonatate (TESSALON) 200 MG capsule, Take 1 capsule (200 mg total) by mouth 3 (three) times a day as needed for cough (Patient not taking: Reported on 4/7/2025), Disp: 20 capsule, Rfl: 0    EPINEPHrine (EPIPEN) 0.3 mg/0.3 mL SOAJ, INJECT THE CONTENTS OF 1 PEN (0.3ML) INTO THE MUSCLE AS NEEDED (Patient not taking: Reported on 4/5/2024), Disp: , Rfl:     silver sulfadiazine (SILVADENE,SSD) 1 % cream, APPLY TOPICALLY ONCE DAILY FOR 5 DAYS (Patient not taking: Reported on 4/5/2024), Disp: , Rfl:     Review of Systems:  Review of Systems   Constitutional:  Negative for chills and fever.   Respiratory:  Negative for shortness of breath.    Cardiovascular:  Negative for chest pain.   Gastrointestinal:  Negative for  "abdominal pain, constipation and diarrhea.   Genitourinary:  Negative for dysuria, frequency, pelvic pain, vaginal discharge and vaginal pain.   Psychiatric/Behavioral:  Negative for self-injury and suicidal ideas.          Physical Exam:  /70   Ht 5' 4\" (1.626 m)   Wt 54.4 kg (120 lb)   LMP 04/04/2025 (Exact Date)   BMI 20.60 kg/m²    Physical Exam  Constitutional:       Appearance: Normal appearance.   Genitourinary:      Vulva and urethral meatus normal.      No labial fusion noted.      No vaginal erythema or tenderness.        Right Adnexa: not tender.     Left Adnexa: not tender.     No cervical discharge or friability.      Uterus is not tender.   Breasts:     Breasts are symmetrical.      Right: Normal.      Left: Normal.   HENT:      Head: Normocephalic and atraumatic.   Neck:      Thyroid: No thyroid mass or thyroid tenderness.   Cardiovascular:      Rate and Rhythm: Normal rate and regular rhythm.      Heart sounds: Normal heart sounds. No murmur heard.  Pulmonary:      Effort: Pulmonary effort is normal.      Breath sounds: Normal breath sounds. No wheezing.   Abdominal:      Palpations: Abdomen is soft. There is no mass.      Tenderness: There is no abdominal tenderness.   Lymphadenopathy:      Cervical: No cervical adenopathy.   Neurological:      General: No focal deficit present.      Mental Status: She is alert and oriented to person, place, and time.   Skin:     General: Skin is warm and dry.   Psychiatric:         Mood and Affect: Mood normal.         Behavior: Behavior normal.   Vitals and nursing note reviewed.                              "

## 2025-04-07 NOTE — PROGRESS NOTES
-

## 2025-05-19 ENCOUNTER — TELEPHONE (OUTPATIENT)
Age: 25
End: 2025-05-19

## 2025-05-19 DIAGNOSIS — N92.3 INTERMENSTRUAL BLEEDING: ICD-10-CM

## 2025-05-19 DIAGNOSIS — Z30.41 ENCOUNTER FOR SURVEILLANCE OF CONTRACEPTIVE PILLS: ICD-10-CM

## 2025-05-19 RX ORDER — DROSPIRENONE AND ETHINYL ESTRADIOL 0.02-3(28)
1 KIT ORAL DAILY
Qty: 84 TABLET | Refills: 1 | Status: SHIPPED | OUTPATIENT
Start: 2025-05-19

## 2025-05-19 NOTE — TELEPHONE ENCOUNTER
They can only dispense the birth control I wrote for which is drosperinone-ethinyl estradiol. Often times, different brands may be given but the medication itself is the same. I will place a new order that says BRAND NECESSARY which should only allow the pharmacy to dispense the same brand with each refill.

## 2025-05-19 NOTE — TELEPHONE ENCOUNTER
"Pt calling in saying that she has a \"different birth control pill for a couple months now as the pharmacy gives me whatever they have in stock. Pt saying that she feels like she's gaining weight from the birth control. Pt is requesting a different type of birth control. Pt saying that if she's unable to get the same pill every month \"then Ill just go off of it I dont like getting a different one every month\".   "